# Patient Record
Sex: MALE | Race: WHITE | NOT HISPANIC OR LATINO | Employment: OTHER | URBAN - METROPOLITAN AREA
[De-identification: names, ages, dates, MRNs, and addresses within clinical notes are randomized per-mention and may not be internally consistent; named-entity substitution may affect disease eponyms.]

---

## 2017-07-14 ENCOUNTER — TELEPHONE (OUTPATIENT)
Dept: NEUROLOGY | Facility: CLINIC | Age: 72
End: 2017-07-14

## 2017-07-14 NOTE — TELEPHONE ENCOUNTER
Spoke to Mariah regarding rescheduling pts appointment.  Mariah states pt has become more combative and has moved out of [facility] against her will. I rescheduled appointment for 7-17-17 with . She will cb if needed.

## 2017-07-17 ENCOUNTER — OFFICE VISIT (OUTPATIENT)
Dept: NEUROLOGY | Facility: CLINIC | Age: 72
End: 2017-07-17

## 2017-07-17 VITALS
BODY MASS INDEX: 23.7 KG/M2 | HEIGHT: 69 IN | DIASTOLIC BLOOD PRESSURE: 74 MMHG | WEIGHT: 160 LBS | SYSTOLIC BLOOD PRESSURE: 128 MMHG

## 2017-07-17 DIAGNOSIS — F01.518 VASCULAR DEMENTIA WITH BEHAVIOR DISTURBANCE (HCC): Primary | ICD-10-CM

## 2017-07-17 PROCEDURE — 99214 OFFICE O/P EST MOD 30 MIN: CPT | Performed by: PSYCHIATRY & NEUROLOGY

## 2017-07-17 NOTE — PROGRESS NOTES
"Subjective      CC: Vascular Dementia    History of Present Illness   Stephen Zarate returns to clinic today with a history of suspected Vascular Dementia . He has noted symptoms since at least early 2016 after a stroke. He has noted some memory impairment. His family has noted more prominent changes in personality and judgment, along with increased irritability and declining hygiene. He and his family feel that this has improved, though not back to his baseline.     Evaluation in [place] reportedly showed evidence of multiple strokes (left occipital and right parietal). Carotid Doppler studies were reportedly normal. He was placed on anticoagulation for new onset atrial fibrillation. An EEG was interpreted as consistent with encephalopathy. Neuropsychological testing was consistent with a moderate dementia. Screening bloodwork in 7/16 was notable for mildly elevated AST and ALT, but otherwise unremarkable.     He was placed on Keppra in [place] for a suspicion of occipital seizures. He had described positive visual phenomena, which resolved quickly with the institution of Keppra. However, these symptoms varied, and were not clearly stereotyped.     Since his last visit on 11/23/16, his family has noted continued problems with anger and mood swings. He has moved himself out of [facility]. His family has considerable concerns about his ability to care for himself. He is apparently taking most of his medications as packaged by his pharmacy. He is not taking insulin currently, and his blood sugars have been high.      The following portions of the patient's history were reviewed and updated as appropriate: allergies, current medications, past family history, past medical history, past social history, past surgical history and problem list.    Review of Systems   Constitutional: Negative.        Objective     /74  Ht 69\" (175.3 cm)  Wt 160 lb (72.6 kg)  BMI 23.63 kg/m2    General appearance today is normal. "       Physical Exam   Constitutional: He is oriented to person, place, and time.   Neurological: He is oriented to person, place, and time. He has normal strength. He has a normal Finger-Nose-Finger Test.   Reflex Scores:       Tricep reflexes are 2+ on the right side and 2+ on the left side.       Bicep reflexes are 2+ on the right side and 2+ on the left side.       Brachioradialis reflexes are 2+ on the right side and 2+ on the left side.  Psychiatric: His speech is normal.        Neurologic Exam     Mental Status   Oriented to person, place, and time.   Registration: recalls 3 of 3 objects. Recall at 5 minutes: recalls 2 of 3 objects. Follows 3 step commands.   Attention: normal.   Speech: speech is normal   Level of consciousness: alert  Able to name object. Able to read. Able to repeat. Able to write. Normal comprehension.     Cranial Nerves   Cranial nerves II through XII intact.     Motor Exam   Muscle bulk: normal  Overall muscle tone: normal    Strength   Strength 5/5 throughout.     Sensory Exam   Light touch normal.     Gait, Coordination, and Reflexes     Coordination   Finger to nose coordination: normal    Reflexes   Right brachioradialis: 2+  Left brachioradialis: 2+  Right biceps: 2+  Left biceps: 2+  Right triceps: 2+  Left triceps: 2+        Results  MMSE=29      Assessment/Plan   Stephen was seen today for follow-up.    Diagnoses and all orders for this visit:    Vascular dementia with behavior disturbance        Discussion/Summary   Stephen Zarate returns to clinic today with a history of suspected vascular dementia (VaD). His history remains consistent with this diagnosis, though his examination remains surprisingly strong. Further, it is difficult to correlate his symptoms (behavioral and dysexecutive syndrome) with occipital and parietal strokes. At this time, I have recommended that we repeat an MRI and neuropsychological testing to assess his condition and competency more carefully. He reports  that he has not been taking Keppra since 11/16 and has been without visual symptoms suggestive of seizure. I discussed the risk of recurrent seizure, though it might be reasonable to remain off of this medication. He is agreeable to remaining on donepezil and memantine, which he believes have been helpful. He will then follow up in 3 months , or sooner if needed.       I spent 30 minutes of face-to-face time with the patient. Approximately 20 minutes were spent in counseling, including review of his current status, symptoms, management and treatment options.

## 2017-07-18 ENCOUNTER — TELEPHONE (OUTPATIENT)
Dept: NEUROLOGY | Facility: CLINIC | Age: 72
End: 2017-07-18
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
## 2017-08-01 ENCOUNTER — APPOINTMENT (OUTPATIENT)
Dept: MRI IMAGING | Facility: HOSPITAL | Age: 72
End: 2017-08-01
Attending: PSYCHIATRY & NEUROLOGY

## 2017-08-02 ENCOUNTER — HOSPITAL ENCOUNTER (OUTPATIENT)
Dept: MRI IMAGING | Facility: HOSPITAL | Age: 72
Discharge: HOME | End: 2017-08-02
Attending: PSYCHIATRY & NEUROLOGY | Admitting: PSYCHIATRY & NEUROLOGY

## 2017-08-02 ENCOUNTER — TELEPHONE (OUTPATIENT)
Dept: NEUROLOGY | Facility: CLINIC | Age: 72
End: 2017-08-02

## 2017-08-02 PROCEDURE — 70551 MRI BRAIN STEM W/O DYE: CPT

## 2017-08-02 NOTE — TELEPHONE ENCOUNTER
----- Message from YAHAIRA Rogers sent at 8/2/2017  9:48 AM EDT -----  Regarding: neuropsych appt  Contact: [phone]  Phone call from daughter, Nadine, they have not heard anything from [facility] about a neuropsych appt. Can you please see what you can find out and call Nadine? Thanks!

## 2017-08-02 NOTE — TELEPHONE ENCOUNTER
Returned Nadine's call and informed her to contact  regarding scheduling process ( I notified Nadine that its usually take a while to get an appt at  neuro psych)  She verbalized understanding, I notified her of  scheduling phone number, and alternative physicians Robert Roberts [phone], Alice Redding [phone]. She said she will think about alternative physicians and cb if needed.

## 2017-08-14 RX ORDER — MEMANTINE HYDROCHLORIDE 10 MG/1
TABLET ORAL
Qty: 60 TABLET | Refills: 11 | Status: SHIPPED | OUTPATIENT
Start: 2017-08-14 | End: 2018-08-08 | Stop reason: SDUPTHER

## 2017-08-14 RX ORDER — DONEPEZIL HYDROCHLORIDE 5 MG/1
TABLET, FILM COATED ORAL
Qty: 30 TABLET | Refills: 11 | Status: SHIPPED | OUTPATIENT
Start: 2017-08-14 | End: 2018-08-08 | Stop reason: SDUPTHER

## 2017-10-11 ENCOUNTER — TELEPHONE (OUTPATIENT)
Dept: NEUROLOGY | Facility: CLINIC | Age: 72
End: 2017-10-11

## 2017-10-11 NOTE — TELEPHONE ENCOUNTER
Pt is scheduled at [facility] 5-3-18 @ 8:00 am. I contacted Mariah and infromed her of apt date/ time she verbalized understanding and said she is not vidhya to keep that apt, gave her # to  so she can call to reschedule. thanks

## 2017-10-11 NOTE — TELEPHONE ENCOUNTER
Daughter, Mariah, called asking if they should keep next week's appt as they haven't been seen for neuropsych testing yet. They haven't been called and given an appointment time (Sherice, can you please follow up on the status of this referral and let me know?). Thank you

## 2017-10-11 NOTE — TELEPHONE ENCOUNTER
The follow-up appointment is to check his overall status, and is not just dependent on the neuropsych testing. However, if he is unchanged and would like to delay follow-up for another 3-6 months that is reasonable. Thanks.

## 2017-10-13 ENCOUNTER — TELEPHONE (OUTPATIENT)
Dept: NEUROLOGY | Facility: CLINIC | Age: 72
End: 2017-10-13
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
## 2017-10-20 ENCOUNTER — OFFICE VISIT (OUTPATIENT)
Dept: NEUROLOGY | Facility: CLINIC | Age: 72
End: 2017-10-20

## 2017-10-20 VITALS
BODY MASS INDEX: 22.81 KG/M2 | SYSTOLIC BLOOD PRESSURE: 123 MMHG | DIASTOLIC BLOOD PRESSURE: 80 MMHG | WEIGHT: 154 LBS | HEIGHT: 69 IN

## 2017-10-20 DIAGNOSIS — F01.518 VASCULAR DEMENTIA WITH BEHAVIOR DISTURBANCE (HCC): Primary | ICD-10-CM

## 2017-10-20 PROCEDURE — 99214 OFFICE O/P EST MOD 30 MIN: CPT | Performed by: PSYCHIATRY & NEUROLOGY

## 2017-10-23 ENCOUNTER — TELEPHONE (OUTPATIENT)
Dept: NEUROLOGY | Facility: CLINIC | Age: 72
End: 2017-10-23

## 2017-10-25 ENCOUNTER — TELEPHONE (OUTPATIENT)
Dept: NEUROLOGY | Facility: CLINIC | Age: 72
End: 2017-10-25

## 2017-10-25 NOTE — TELEPHONE ENCOUNTER
Phone call with Mariah to discuss care plan options after Mr. Zarate's last visit. She would like to pursue referral to [facility] Dr. Phuc Downs for neuropsych testing to see if they have a sooner appt than Dr. Vang at . She understands her options of calling APS or pursuing guardianship if she continues to feel he's not caring for himself properly. She does not feel she should pursue guardianship at this time and would like to see results of neuropsych testing. Our office sent referral to [facility] in [place]. She has phone numbers to both [facility] and  to keep in touch on getting an appt.

## 2018-08-09 RX ORDER — MEMANTINE HYDROCHLORIDE 10 MG/1
TABLET ORAL
Qty: 60 TABLET | Refills: 11 | Status: SHIPPED | OUTPATIENT
Start: 2018-08-09 | End: 2019-08-19 | Stop reason: SDUPTHER

## 2018-08-09 RX ORDER — DONEPEZIL HYDROCHLORIDE 5 MG/1
TABLET, FILM COATED ORAL
Qty: 30 TABLET | Refills: 11 | Status: SHIPPED | OUTPATIENT
Start: 2018-08-09 | End: 2019-08-19 | Stop reason: SDUPTHER

## 2018-09-19 ENCOUNTER — TELEPHONE (OUTPATIENT)
Dept: NEUROLOGY | Facility: CLINIC | Age: 73
End: 2018-09-19

## 2018-09-28 ENCOUNTER — OFFICE VISIT (OUTPATIENT)
Dept: NEUROLOGY | Facility: CLINIC | Age: 73
End: 2018-09-28

## 2018-09-28 VITALS
WEIGHT: 154 LBS | SYSTOLIC BLOOD PRESSURE: 123 MMHG | BODY MASS INDEX: 22.81 KG/M2 | HEIGHT: 69 IN | DIASTOLIC BLOOD PRESSURE: 84 MMHG

## 2018-09-28 DIAGNOSIS — F01.518 VASCULAR DEMENTIA WITH BEHAVIOR DISTURBANCE (HCC): Primary | ICD-10-CM

## 2018-09-28 DIAGNOSIS — I67.9 CEREBROVASCULAR DISEASE: ICD-10-CM

## 2018-09-28 PROCEDURE — 99214 OFFICE O/P EST MOD 30 MIN: CPT | Performed by: PSYCHIATRY & NEUROLOGY

## 2018-09-28 NOTE — PROGRESS NOTES
Subjective      CC: Vascular Dementia    History of Present Illness   Stephen Zarate returns to clinic today with a history of suspected Vascular Dementia . He has noted symptoms since at least early 2016 after a stroke. He has noted some memory impairment. His family has noted more prominent changes in personality and judgment, along with increased irritability and declining hygiene.     Prior evaluation in [place] reportedly showed evidence of multiple strokes (left occipital and right parietal). Carotid Doppler studies were reportedly normal. He was placed on anticoagulation for new onset atrial fibrillation. An EEG was interpreted as consistent with encephalopathy. Neuropsychological testing was consistent with a moderate dementia. Screening bloodwork in 7/16 was notable for mildly elevated AST and ALT, but otherwise unremarkable.     He was placed on Keppra in [place] for a suspicion of occipital seizures. He had described positive visual phenomena, which resolved quickly with the institution of Keppra. However, these symptoms varied, and were not clearly stereotyped. He stopped this medication in 11/16 and has had no recurrent symptoms suggestive of seizures.    Neuropsychological testing from 5/18 is consistent with VaD, including impairments in executive function.    Since his last visit on 10/20/17 he feels again improved. He does endorse some difficulty with memory, though feels he is doing well overall. His family continues to note widespread impairments in cognition. His daughter is helping with finances, though earlier he did let his insurance lapse. He receives some help from [facility].       The following portions of the patient's history were reviewed and updated as appropriate: allergies, current medications, past family history, past medical history, past social history, past surgical history and problem list.    Review of Systems   Constitutional: Negative.        Objective     /84   Ht  "175.3 cm (69.02\")   Wt 69.9 kg (154 lb)   BMI 22.73 kg/m²     General appearance today is normal.       Physical Exam   Psychiatric: His speech is normal.        Neurologic Exam     Mental Status   Oriented to person.   Disoriented to place.   Disoriented to time.   Registration: recalls 3 of 3 objects. Recall at 5 minutes: recalls 3 of 3 objects. Follows 3 step commands.   Attention: normal.   Speech: speech is normal   Level of consciousness: alert  Able to name object. Able to read. Able to repeat. Able to write. Normal comprehension.     Cranial Nerves   Cranial nerves II through XII intact.         Results  MMSE=26      Assessment/Plan   Stephen was seen today for memory loss.    Diagnoses and all orders for this visit:    Vascular dementia with behavior disturbance    Cerebrovascular disease        Discussion/Summary   Stephen Zarate returns to clinic today with a history of suspected vascular dementia (VaD). I reviewed his neuropsychological testing in some detail. I did discuss my belief that he needs proper safeguards in place, particularly for finances and medications. It is possible that we will need to re-examine his living conditions as a result, which I also mentioned. He has previously passed a formal driving evaluation, but we discussed that this could be repeated if concerns arise.  He will then follow up in 6 months, or sooner if needed.     I spent 25 minutes face to face with the patient and family. I spent 20 minutes counseling and discussing diagnosis, prognosis, diagnostic testing, evaluation, current status, treatment options and management.  "

## 2018-10-01 PROBLEM — I67.9 CEREBROVASCULAR DISEASE: Status: ACTIVE | Noted: 2018-10-01

## 2019-01-31 ENCOUNTER — TELEPHONE (OUTPATIENT)
Dept: NEUROLOGY | Facility: CLINIC | Age: 74
End: 2019-01-31

## 2019-01-31 NOTE — TELEPHONE ENCOUNTER
----- Message from Bethanie Goyal sent at 1/30/2019 10:58 AM EST -----  Contact: DAUGHTER  Mariah Cox - daughter called about her father.  Possibly moving him to [place] and needing suggestions about transportation options and daily help with him.    [phone]

## 2019-02-01 ENCOUNTER — TELEPHONE (OUTPATIENT)
Dept: NEUROLOGY | Facility: CLINIC | Age: 74
End: 2019-02-01

## 2019-02-01 NOTE — TELEPHONE ENCOUNTER
Spoke to Mariha Cox, daughter, who let me know that Mr. Zarate is moving from [place] to [place] to be closer to her. She is interested in services for him at home. He currently has caregivers coming in morning to remind him of meds, make meals and help get him ready for the day. She is needing names of new agencies for [place]. She is interested in transportation, and I will send her information on transportation companies as well. He will be near the [facility] which would be ideal to have him attend often. He is needing help with insulin which the caregiver agency can help remind him and set up the pen but cannot inject, which she understands. She is looking into a lock box for this as he's administered insulin incorrectly before. I am sending her information on pharmacies that can deliver dose packs if [facility] cannot deliver to his new address. She understands she can call me at any time to discuss other options if needed. I will email her the resources we discussed.

## 2019-04-15 ENCOUNTER — OFFICE VISIT (OUTPATIENT)
Dept: NEUROLOGY | Facility: CLINIC | Age: 74
End: 2019-04-15

## 2019-04-15 VITALS — WEIGHT: 154 LBS | HEIGHT: 69 IN | BODY MASS INDEX: 22.81 KG/M2

## 2019-04-15 DIAGNOSIS — I67.9 CEREBROVASCULAR DISEASE: ICD-10-CM

## 2019-04-15 DIAGNOSIS — F01.518 VASCULAR DEMENTIA WITH BEHAVIOR DISTURBANCE (HCC): Primary | ICD-10-CM

## 2019-04-15 PROCEDURE — 99214 OFFICE O/P EST MOD 30 MIN: CPT | Performed by: PHYSICIAN ASSISTANT

## 2019-04-15 RX ORDER — GLIMEPIRIDE 4 MG/1
TABLET ORAL
COMMUNITY
Start: 2017-11-02

## 2019-04-15 RX ORDER — SACCHAROMYCES BOULARDII 250 MG
CAPSULE ORAL
COMMUNITY
Start: 2017-11-02 | End: 2020-12-11

## 2019-04-15 NOTE — PROGRESS NOTES
Subjective     Chief Complaint: Vascular Dementia      History of Present Illness   Stephen Zarate is a 74 y.o. male who returns to clinic today with a history of suspected Vascular Dementia . He has noted symptoms since at least early 2016 after a stroke. He has noted some memory impairment. His family has noted more prominent changes in personality and judgment, along with increased irritability and declining hygiene.      Prior evaluation in [place] reportedly showed evidence of multiple strokes (left occipital and right parietal). Carotid Doppler studies were reportedly normal. He was placed on anticoagulation for new onset atrial fibrillation. An EEG was interpreted as consistent with encephalopathy. Neuropsychological testing was consistent with a moderate dementia. Screening bloodwork in 7/16 was notable for mildly elevated AST and ALT, but otherwise unremarkable.      He was placed on Keppra in [place] for a suspicion of occipital seizures. He had described positive visual phenomena, which resolved quickly with the institution of Keppra. However, these symptoms varied, and were not clearly stereotyped. He stopped this medication in 11/16 and has had no recurrent symptoms suggestive of seizures.     Neuropsychological testing from 5/18 is consistent with VaD, including impairments in executive function.    Today: Since his last visit in 9/18, he feels that his memory is improved. His family has noted a cognitive decline, particularly since moving to [place] in 1/19. His family has also noted occasional agitation and trouble sleeping. Additionally, he has noted some balance impairment.       I have reviewed and confirmed the past family, social and medical history as accurate on 4/15/19.     Review of Systems   Constitutional: Negative.    HENT: Negative.    Eyes: Negative.    Respiratory: Negative.    Cardiovascular: Negative.    Gastrointestinal: Negative.    Endocrine: Negative.    Genitourinary: Negative.  "   Musculoskeletal: Negative.    Skin: Negative.    Allergic/Immunologic: Negative.    Neurological:        Memory loss     Hematological: Negative.    Psychiatric/Behavioral: Positive for agitation. The patient is nervous/anxious.        Objective     Ht 175.3 cm (69.02\")   Wt 69.9 kg (154 lb)   BMI 22.73 kg/m²     General appearance today is normal.     Physical Exam   Constitutional: He is oriented to person, place, and time.   Neurological: He is oriented to person, place, and time. He has normal strength. He has a normal Finger-Nose-Finger Test.   Psychiatric: His speech is normal.        Neurologic Exam     Mental Status   Oriented to person, place, and time.   Disoriented to date.   Registration: recalls 3 of 3 objects. Recall at 5 minutes: recalls 3 of 3 objects. Follows 3 step commands.   Attention: normal.   Speech: speech is normal   Level of consciousness: alert  Able to name object. Able to read. Able to repeat. Able to write. Normal comprehension.     Cranial Nerves   Cranial nerves II through XII intact.     Motor Exam   Muscle bulk: normal  Overall muscle tone: normal    Strength   Strength 5/5 throughout.     Sensory Exam   Light touch normal.     Gait, Coordination, and Reflexes     Coordination   Finger to nose coordination: normal        Results  MMSE=28      Assessment/Plan   Stephen was seen today for memory loss.    Diagnoses and all orders for this visit:    Vascular dementia with behavior disturbance  -     Ambulatory Referral to Home Health    Cerebrovascular disease          Discussion/Summary   Stephen Zarate returns to clinic today with a history of suspected vascular dementia (VaD). This was discussed. I again reviewed his current status and treatment options. After discussing potential treatment options, it was elected to continue on donepezil and memantine unchanged. I also discussed potentially adding mirtazapine to help with his agitation and sleep, though this was ultimately " declined for now. I have made a referral to home health for PT given his balance impairment. He will then follow up in 6 months, or sooner if needed.   I spent 25 minutes face to face with the patient and family with 15 minutes spent on discussing diagnosis, evaluation, current status, treatment options and management as discussed above.       As part of this visit I obtained additional history from the family which is incorporated in the HPI.      Candice Stubbs PA-C

## 2019-08-08 ENCOUNTER — TELEPHONE (OUTPATIENT)
Dept: NEUROLOGY | Facility: CLINIC | Age: 74
End: 2019-08-08

## 2019-08-08 NOTE — TELEPHONE ENCOUNTER
Daughter left me a voicemail and emailed me about filling out a form for his [employer] application, transportation, stating his abilities/disability. She sent me the electronic web form to complete and I asked her for a printer friendly form to use that I can print for providers to fill out. Also informed her there is a form fee and we would call her when complete. I will await sending this to providers until we get a version I can print out for them to complete.

## 2019-08-19 RX ORDER — DONEPEZIL HYDROCHLORIDE 5 MG/1
TABLET, FILM COATED ORAL
Qty: 141 TABLET | Refills: 0 | Status: SHIPPED | OUTPATIENT
Start: 2019-08-19 | End: 2019-10-18

## 2019-08-19 RX ORDER — MEMANTINE HYDROCHLORIDE 10 MG/1
TABLET ORAL
Qty: 282 TABLET | Refills: 0 | Status: SHIPPED | OUTPATIENT
Start: 2019-08-19 | End: 2019-12-27

## 2019-08-31 ENCOUNTER — OFFICE VISIT (OUTPATIENT)
Dept: RETAIL CLINIC | Facility: CLINIC | Age: 74
End: 2019-08-31

## 2019-08-31 VITALS
WEIGHT: 156 LBS | BODY MASS INDEX: 23.11 KG/M2 | DIASTOLIC BLOOD PRESSURE: 64 MMHG | RESPIRATION RATE: 16 BRPM | HEIGHT: 69 IN | HEART RATE: 82 BPM | TEMPERATURE: 98.7 F | SYSTOLIC BLOOD PRESSURE: 100 MMHG

## 2019-08-31 DIAGNOSIS — H66.93 ACUTE OTITIS MEDIA, BILATERAL: Primary | ICD-10-CM

## 2019-08-31 DIAGNOSIS — J40 BRONCHITIS: ICD-10-CM

## 2019-08-31 PROCEDURE — 99203 OFFICE O/P NEW LOW 30 MIN: CPT | Performed by: NURSE PRACTITIONER

## 2019-08-31 RX ORDER — BENZONATATE 100 MG/1
100 CAPSULE ORAL 3 TIMES DAILY PRN
Qty: 21 CAPSULE | Refills: 0 | Status: SHIPPED | OUTPATIENT
Start: 2019-08-31 | End: 2019-09-07

## 2019-08-31 RX ORDER — AMOXICILLIN AND CLAVULANATE POTASSIUM 875; 125 MG/1; MG/1
1 TABLET, FILM COATED ORAL 2 TIMES DAILY
Qty: 20 TABLET | Refills: 0 | Status: SHIPPED | OUTPATIENT
Start: 2019-08-31 | End: 2019-09-10

## 2019-08-31 RX ORDER — GUAIFENESIN AND DEXTROMETHORPHAN HYDROBROMIDE 600; 30 MG/1; MG/1
1 TABLET, EXTENDED RELEASE ORAL 2 TIMES DAILY
Qty: 20 TABLET | Refills: 0 | Status: SHIPPED | OUTPATIENT
Start: 2019-08-31 | End: 2020-12-11

## 2019-08-31 NOTE — PROGRESS NOTES
"Subjective   Cough    Stephen Zarate is a 74 y.o. male with a history of diabetes, who is brought in by his daughter for evaluation of cough. Patient also with a h/o dementia and CVA. Daughter says cough intitially improved, now worsening with malaise and body aches. Mr. Zarate says that he sometimes gets strangled on thin liquids \"it goes down the wrong pipe\". Daughter reports blood glucose readings <110. Of note: patient takes Eliquis for treatment of atrial fibrillation.     Cough   This is a new problem. The current episode started 1 to 4 weeks ago. The problem has been gradually worsening. The problem occurs every few minutes. Cough characteristics: occasionally productive. Associated symptoms include ear congestion, a fever (subjective), headaches, myalgias, nasal congestion and shortness of breath (at times). Pertinent negatives include no chest pain, heartburn, hemoptysis, rash, sore throat, weight loss or wheezing. Postnasal drip: possibly. The symptoms are aggravated by lying down. He has tried nothing for the symptoms. His past medical history is significant for environmental allergies. There is no history of asthma, bronchitis or emphysema.        History Obtained from: Patient and Family    Past Medical History:   Diagnosis Date   • Atrial fibrillation (CMS/HCC)    • Dementia    • Diabetes mellitus (CMS/HCC)    • Hyperlipidemia    • Hypertension    • Seizure (CMS/HCC)    • Stroke (CMS/HCC)      History reviewed. No pertinent surgical history.  Social History     Tobacco Use   • Smoking status: Never Smoker   Substance Use Topics   • Alcohol use: No   • Drug use: No     Family History   Problem Relation Age of Onset   • Dementia Mother    • Alzheimer's disease Mother    • Hypertension Mother    • Stroke Mother    • Dementia Father    • Alzheimer's disease Father    • Hypertension Father      Allergies   Allergen Reactions   • Inositol Niacinate Hives   • Niacin Hives   • Sulfa Antibiotics      Current " Outpatient Medications   Medication Sig Dispense Refill   • apixaban (ELIQUIS) 5 MG tablet tablet Take 5 mg by mouth 2 (two) times a day.     • atorvastatin (LIPITOR) 40 MG tablet Take 40 mg by mouth daily.     • donepezil (ARICEPT) 5 MG tablet TAKE 1 TABLET BY MOUTH ONCE DAILY 141 tablet 0   • Empagliflozin (JARDIANCE PO) Take  by mouth.     • glimepiride (AMARYL) 4 MG tablet Take  by mouth.     • Insulin Degludec (TRESIBA SC) Inject  under the skin into the appropriate area as directed.     • losartan (COZAAR) 25 MG tablet Take 25 mg by mouth daily.     • memantine (NAMENDA) 10 MG tablet TAKE 1 TABLET BY MOUTH TWICE DAILY 282 tablet 0   • potassium chloride (K-DUR) 10 MEQ CR tablet Take 10 mEq by mouth 2 (two) times a day.     • ranitidine (ZANTAC) 150 MG tablet Take 150 mg by mouth 2 (two) times a day.     • saccharomyces boulardii (FLORASTOR) 250 MG capsule Take  by mouth.     • triamcinolone (KENALOG) 0.1 % cream Apply  topically 2 (two) times a day.     • amoxicillin-clavulanate (AUGMENTIN) 875-125 MG per tablet Take 1 tablet by mouth 2 (Two) Times a Day for 10 days. 20 tablet 0   • benzonatate (TESSALON PERLES) 100 MG capsule Take 1 capsule by mouth 3 (Three) Times a Day As Needed for Cough for up to 7 days. 21 capsule 0   • clindamycin (CLEOCIN) 300 MG capsule Take 300 mg by mouth 3 (three) times a day.     • glucose blood (EASYMAX 15 TEST) test strip 1 each by Other route as needed. Use as instructed     • guaifenesin-dextromethorphan (MUCINEX DM)  MG tablet sustained-release 12 hour tablet Take 1 tablet by mouth 2 (Two) Times a Day. 20 tablet 0   • levofloxacin (LEVAQUIN) 500 MG tablet Take 500 mg by mouth daily.     • potassium chloride (K-DUR,KLOR-CON) 10 MEQ CR tablet Take 10 mEq by mouth 2 (two) times a day.     • saccharomyces boulardii (FLORASTOR) 250 MG capsule Take 250 mg by mouth 2 (two) times a day.     • traMADol (ULTRAM) 50 MG tablet Take 50 mg by mouth every 6 (six) hours as needed for  "moderate pain (4-6).       No current facility-administered medications for this visit.         The following portions of the patient's history were reviewed and updated as appropriate: allergies, current medications, past family history, past medical history, past social history and past surgical history.    Review of Systems   Constitutional: Positive for appetite change, fatigue and fever (subjective). Negative for weight loss.   HENT: Positive for congestion and trouble swallowing (at times, has trouble with thin liquids). Negative for ear discharge, mouth sores, sinus pressure, sneezing and sore throat. Postnasal drip: possibly.    Eyes: Negative.    Respiratory: Positive for cough and shortness of breath (at times). Negative for hemoptysis, chest tightness, wheezing and stridor.    Cardiovascular: Negative for chest pain and palpitations.   Gastrointestinal: Negative for diarrhea, heartburn, nausea and vomiting.   Endocrine: Negative for polydipsia, polyphagia and polyuria.   Musculoskeletal: Positive for arthralgias and myalgias. Negative for neck pain and neck stiffness.   Skin: Negative for rash and wound.   Allergic/Immunologic: Positive for environmental allergies. Negative for immunocompromised state.   Neurological: Positive for numbness (neuropathy) and headaches. Negative for dizziness and light-headedness.   Psychiatric/Behavioral: Positive for confusion (at times, memory deficit) and sleep disturbance (due to coughing). Negative for agitation.       Objective     VITAL SIGNS:   Vitals:    08/31/19 1602   BP: 100/64   Pulse: 82   Resp: 16   Temp: 98.7 °F (37.1 °C)   Weight: 70.8 kg (156 lb)   Height: 175.3 cm (69.02\")   Body mass index is 23.02 kg/m².    Physical Exam   Constitutional: He is cooperative.  Non-toxic appearance. No distress.   HENT:   Head: Atraumatic.   Right Ear: External ear and ear canal normal. Tympanic membrane is erythematous (mild). Tympanic membrane is not perforated and not " bulging. A middle ear effusion is present.   Left Ear: External ear and ear canal normal. Tympanic membrane is erythematous. Tympanic membrane is not perforated and not bulging.   Nose: No mucosal edema or nasal deformity.   Mouth/Throat: Uvula is midline. No posterior oropharyngeal edema or posterior oropharyngeal erythema.   Eyes: Pupils are equal, round, and reactive to light. Right conjunctiva is not injected. Left conjunctiva is not injected. No scleral icterus.   Neck: Phonation normal. Neck supple. No tracheal deviation present.   Cardiovascular: Normal rate and regular rhythm.   No murmur heard.  Pulmonary/Chest: No stridor. No tachypnea. No respiratory distress. He has no decreased breath sounds. He has no wheezes. He has rhonchi (bilateral) in the right upper field and the left upper field. He has no rales.   Deep, course coughing   Musculoskeletal: He exhibits no edema.   Neurological: He is alert.   Skin: Skin is warm and dry.   Psychiatric: His behavior is normal. His mood appears not anxious. Cognition and memory are impaired. He is attentive.           Assessment/Plan     Stephen was seen today for cough.    Diagnoses and all orders for this visit:    Acute otitis media, bilateral  -     Ambulatory Referral to Internal Medicine    Bronchitis  -     Ambulatory Referral to Internal Medicine    Other orders  -     amoxicillin-clavulanate (AUGMENTIN) 875-125 MG per tablet; Take 1 tablet by mouth 2 (Two) Times a Day for 10 days.  -     benzonatate (TESSALON PERLES) 100 MG capsule; Take 1 capsule by mouth 3 (Three) Times a Day As Needed for Cough for up to 7 days.  -     guaifenesin-dextromethorphan (MUCINEX DM)  MG tablet sustained-release 12 hour tablet; Take 1 tablet by mouth 2 (Two) Times a Day.        PLAN: Discussed diabetic sick day protocol and need for increased po intake of decaffeinated fluids. DD discussed with patient and daughter- advised to go to ER if worsening.  Patient should follow up  with primary care provider in 2 weeks and also for evaluation of unspecified dysphagia. See sooner if symptoms persist or for the development of new symptoms that need attention.    The patient/family voiced understanding and agreement to the patient treatment plan and instructions       RENE Austin

## 2019-10-18 ENCOUNTER — OFFICE VISIT (OUTPATIENT)
Dept: NEUROLOGY | Facility: CLINIC | Age: 74
End: 2019-10-18

## 2019-10-18 VITALS — WEIGHT: 156 LBS | HEART RATE: 85 BPM | OXYGEN SATURATION: 94 % | BODY MASS INDEX: 23.11 KG/M2 | HEIGHT: 69 IN

## 2019-10-18 DIAGNOSIS — I67.9 CEREBROVASCULAR DISEASE: Primary | ICD-10-CM

## 2019-10-18 DIAGNOSIS — F01.518 VASCULAR DEMENTIA WITH BEHAVIOR DISTURBANCE (HCC): ICD-10-CM

## 2019-10-18 PROCEDURE — 99214 OFFICE O/P EST MOD 30 MIN: CPT | Performed by: PSYCHIATRY & NEUROLOGY

## 2019-10-18 RX ORDER — PIOGLITAZONEHYDROCHLORIDE 15 MG/1
1 TABLET ORAL
COMMUNITY
Start: 2017-03-17

## 2019-10-18 RX ORDER — EMPAGLIFLOZIN 25 MG/1
TABLET, FILM COATED ORAL
Refills: 5 | COMMUNITY
Start: 2019-09-24

## 2019-10-18 RX ORDER — INSULIN DEGLUDEC 200 U/ML
INJECTION, SOLUTION SUBCUTANEOUS
Refills: 0 | COMMUNITY
Start: 2019-10-11

## 2019-10-18 RX ORDER — LANCING DEVICE
EACH MISCELLANEOUS
Refills: 0 | COMMUNITY
Start: 2019-07-15

## 2019-10-18 RX ORDER — LEVETIRACETAM 500 MG/1
1 TABLET ORAL EVERY 12 HOURS
COMMUNITY
Start: 2017-03-14 | End: 2020-12-11

## 2019-10-18 RX ORDER — OMEPRAZOLE 40 MG/1
1 CAPSULE, DELAYED RELEASE ORAL
COMMUNITY
Start: 2017-02-08 | End: 2020-12-11

## 2019-10-18 RX ORDER — SACCHAROMYCES BOULARDII 250 MG
CAPSULE ORAL
COMMUNITY
Start: 2017-11-02

## 2019-10-18 RX ORDER — CETIRIZINE HYDROCHLORIDE 10 MG/1
1 TABLET ORAL
COMMUNITY
Start: 2017-03-14

## 2019-10-18 RX ORDER — DONEPEZIL HYDROCHLORIDE 10 MG/1
10 TABLET, FILM COATED ORAL DAILY
Qty: 30 TABLET | Refills: 11 | Status: SHIPPED | OUTPATIENT
Start: 2019-10-18 | End: 2020-09-15

## 2019-10-18 RX ORDER — LANCETS 28 GAUGE
EACH MISCELLANEOUS
Refills: 2 | COMMUNITY
Start: 2019-09-23

## 2019-10-18 RX ORDER — ACETAMINOPHEN 160 MG
TABLET,DISINTEGRATING ORAL
Refills: 4 | COMMUNITY
Start: 2019-07-30

## 2019-10-18 NOTE — PROGRESS NOTES
"Subjective     Chief Complaint: Vascular Dementia      History of Present Illness   Stephen Zarate is a 74 y.o. male who returns to clinic today with a history of suspected Vascular Dementia . He has noted symptoms since at least early 2016 after a stroke. He has noted some memory impairment. His family has noted more prominent changes in personality and judgment, along with increased irritability and declining hygiene.      Prior evaluation in [place] reportedly showed evidence of multiple strokes (left occipital and right parietal). Carotid Doppler studies were reportedly normal. He was placed on anticoagulation for new onset atrial fibrillation. An EEG was interpreted as consistent with encephalopathy. Neuropsychological testing was consistent with a moderate dementia. Screening bloodwork in 7/16 was notable for mildly elevated AST and ALT, but otherwise unremarkable.      He was placed on Keppra in [place] for a suspicion of occipital seizures. He had described positive visual phenomena, which resolved quickly with the institution of Keppra. However, these symptoms varied, and were not clearly stereotyped. He stopped this medication in 11/16 and has had no recurrent symptoms suggestive of seizures.     Neuropsychological testing from 5/18 is consistent with VaD, including impairments in executive function.    Since his last visit on 4/15/19 his general health has improved along with better medication compliance under the supervision of his daughter. He feels improved overall, though his daughter notes continued impairments across all spheres of cognition which is relatively unchanged. He is sleeping well and is not currently having any BPSD.     I have reviewed and confirmed the past family, social and medical history as accurate on 10/18/19.     Review of Systems   Constitutional: Negative.        Objective     Pulse 85   Ht 175.3 cm (69\")   Wt 70.8 kg (156 lb)   SpO2 94%   BMI 23.04 kg/m²     General " appearance today is normal.     Physical Exam   Constitutional: He is oriented to person, place, and time.   Neurological: He is oriented to person, place, and time. He has normal strength.   Psychiatric: His speech is normal.        Neurologic Exam     Mental Status   Oriented to person, place, and time.   Registration: recalls 3 of 3 objects. Recall at 5 minutes: recalls 2 of 3 objects. Follows 3 step commands.   Attention: normal.   Speech: speech is normal   Level of consciousness: alert  Able to name object. Able to read. Able to repeat. Able to write. Normal comprehension.     Cranial Nerves   Cranial nerves II through XII intact.     Motor Exam   Muscle bulk: normal  Overall muscle tone: normal    Strength   Strength 5/5 throughout.         Results  MMSE=27      Assessment/Plan   Stephen was seen today for follow-up and alzheimer's disease.    Diagnoses and all orders for this visit:    Cerebrovascular disease  -     Ambulatory Referral to Home Health    Vascular dementia with behavior disturbance (CMS/HCC)  -     Ambulatory Referral to Home Health    Other orders  -     donepezil (ARICEPT) 10 MG tablet; Take 1 tablet by mouth Daily.          Discussion/Summary   Stephen Zarate returns to clinic today with a history of suspected vascular dementia (VaD). This was discussed. I again reviewed his current status and treatment options. After discussing potential treatment options, it was elected to continue on memantine unchanged and increase donepezil to 10 mg daily. I have again made a referral to home health for PT given his balance impairment. He will then follow up in 6 months, or sooner if needed.     I spent 25 minutes face to face with the patient and family with 15 minutes spent on discussing evaluation, current status, treatment options and management as discussed above.       As part of this visit I obtained additional history from the family which is incorporated in the HPI.      Lavelle Awad MD

## 2019-12-27 RX ORDER — MEMANTINE HYDROCHLORIDE 10 MG/1
TABLET ORAL
Qty: 282 TABLET | Refills: 5 | Status: SHIPPED | OUTPATIENT
Start: 2019-12-27 | End: 2021-01-11

## 2020-05-12 ENCOUNTER — TELEMEDICINE (OUTPATIENT)
Dept: NEUROLOGY | Facility: CLINIC | Age: 75
End: 2020-05-12

## 2020-05-12 DIAGNOSIS — F01.518 VASCULAR DEMENTIA WITH BEHAVIOR DISTURBANCE (HCC): ICD-10-CM

## 2020-05-12 DIAGNOSIS — I67.9 CEREBROVASCULAR DISEASE: Primary | ICD-10-CM

## 2020-05-12 PROCEDURE — 99214 OFFICE O/P EST MOD 30 MIN: CPT | Performed by: PHYSICIAN ASSISTANT

## 2020-05-12 NOTE — PROGRESS NOTES
Subjective     Chief Complaint: vascular dementia      History of Present Illness   Stephen Zarate is a 75 y.o. male who returns to clinic today with a history of suspected Vascular Dementia . He has noted symptoms since at least early 2016 after a stroke. He has noted some memory impairment. His family has noted more prominent changes in personality and judgment, along with increased irritability and declining hygiene.      Prior evaluation in [place] reportedly showed evidence of multiple strokes (left occipital and right parietal). Carotid Doppler studies were reportedly normal. He was placed on anticoagulation for new onset atrial fibrillation. An EEG was interpreted as consistent with encephalopathy. Neuropsychological testing was consistent with a moderate dementia. Screening bloodwork in 7/16 was notable for mildly elevated AST and ALT, but otherwise unremarkable.      He was placed on Keppra in [place] for a suspicion of occipital seizures. He had described positive visual phenomena, which resolved quickly with the institution of Keppra. However, these symptoms varied, and were not clearly stereotyped. He stopped this medication in 11/16 and has had no recurrent symptoms suggestive of seizures.     Neuropsychological testing from 5/18 is consistent with VaD, including impairments in executive function.    Today: Since his last visit in 10/19, he feels that his memory may be somewhat worse. His family agrees.        I have reviewed and confirmed the past family, social and medical history as accurate on 5/12/2020.     Review of Systems   Constitutional: Negative.    HENT: Negative.    Eyes: Negative.    Respiratory: Negative.    Cardiovascular: Negative.    Gastrointestinal: Negative.    Endocrine: Negative.    Genitourinary: Negative.    Musculoskeletal: Negative.    Skin: Negative.    Allergic/Immunologic: Negative.    Neurological:        Memory loss    Hematological: Negative.        Objective     General  appearance today is normal.         Physical Exam   Psychiatric: His speech is normal.        Neurologic Exam     Mental Status   Oriented to person.   Oriented to place.   Disoriented to time. Disoriented to month, date and season. Oriented to year and day.   Registration: recalls 3 of 3 objects. Recall at 5 minutes: recalls 2 of 3 objects. Follows 3 step commands.   Attention: normal.   Speech: speech is normal   Level of consciousness: alert  Able to name object. Able to read. Able to repeat. Able to write. Normal comprehension.         Results  MMSE=25      Assessment/Plan   Diagnoses and all orders for this visit:    Cerebrovascular disease  -     Ambulatory Referral to Speech Therapy    Vascular dementia with behavior disturbance (CMS/HCC)  -     Ambulatory Referral to Speech Therapy          Discussion/Summary   Stephen Zarate returns to clinic today with a history of suspected vascular dementia (VaD). I again reviewed his current status and treatment options. After discussing potential treatment options, it was elected to continue on  donepezil and memantine unchanged. I have also made a referral to [facility]LP for cognitive rehabilitation. He will then follow up in 6 months , or sooner if needed.   I spent 25 minutes face to face with the patient and family with 15 minutes spent on discussing evaluation, current status, treatment options and management as discussed above.     This visit was performed via [employer] Video Chat with patient's consent.     As part of this visit I obtained additional history from the family which is incorporated in the HPI.      Candice Stubbs PA-C

## 2020-07-17 ENCOUNTER — TELEPHONE (OUTPATIENT)
Dept: NEUROLOGY | Facility: CLINIC | Age: 75
End: 2020-07-17

## 2020-07-17 NOTE — TELEPHONE ENCOUNTER
Kenzie with Dr Cabrera Oral surgery ([phone]) called regarding pt Eliquis 5 mg needing advise how to handle pt blood thinner. Please advise. Thanks.

## 2020-07-20 NOTE — TELEPHONE ENCOUNTER
Called and spoke to Neema with Dr. Cabrera office informing per Dr Esparza advice to referring to pt Cardiologist. Neema stated verbal understanding and appreciation. Thanks.

## 2020-09-15 RX ORDER — DONEPEZIL HYDROCHLORIDE 10 MG/1
TABLET, FILM COATED ORAL
Qty: 30 TABLET | Refills: 11 | Status: SHIPPED | OUTPATIENT
Start: 2020-09-15 | End: 2021-09-07

## 2020-12-11 ENCOUNTER — OFFICE VISIT (OUTPATIENT)
Dept: NEUROLOGY | Facility: CLINIC | Age: 75
End: 2020-12-11

## 2020-12-11 VITALS
OXYGEN SATURATION: 99 % | SYSTOLIC BLOOD PRESSURE: 124 MMHG | DIASTOLIC BLOOD PRESSURE: 84 MMHG | TEMPERATURE: 97.3 F | HEART RATE: 72 BPM

## 2020-12-11 DIAGNOSIS — I67.9 CEREBROVASCULAR DISEASE: Primary | ICD-10-CM

## 2020-12-11 DIAGNOSIS — F01.518 VASCULAR DEMENTIA WITH BEHAVIOR DISTURBANCE (HCC): ICD-10-CM

## 2020-12-11 PROCEDURE — 99213 OFFICE O/P EST LOW 20 MIN: CPT | Performed by: PSYCHIATRY & NEUROLOGY

## 2020-12-11 NOTE — PROGRESS NOTES
Subjective     Chief Complaint: vascular dementia      History of Present Illness   Stephen Zarate is a 75 y.o. male who returns to clinic today with a history of suspected Vascular Dementia . He has noted symptoms since at least early 2016 after a stroke. He has noted some memory impairment. His family has noted more prominent changes in personality and judgment, along with increased irritability and declining hygiene.      Prior evaluation in [place] reportedly showed evidence of multiple strokes (left occipital and right parietal). Carotid Doppler studies were reportedly normal. He was placed on anticoagulation for new onset atrial fibrillation. An EEG was interpreted as consistent with encephalopathy. Neuropsychological testing was consistent with a moderate dementia. Screening bloodwork in 7/16 was notable for mildly elevated AST and ALT, but otherwise unremarkable.      He was placed on Keppra in [place] for a suspicion of occipital seizures. He had described positive visual phenomena, which resolved quickly with the institution of Keppra. However, these symptoms varied, and were not clearly stereotyped. He stopped this medication in 11/16 and has had no recurrent symptoms suggestive of seizures.     Neuropsychological testing from 5/18 is consistent with VaD, including impairments in executive function.    Since his last visit on 5/12/20 he feels unchanged. He has gotten lost walking on one occasion. His family has noted only slight decline cognitively. No seizure like activity has been noted.      I have reviewed and confirmed the past family, social and medical history as accurate on 12/11/20.     Review of Systems   Constitutional: Negative.        Objective     General appearance today is normal.         Physical Exam   Neurological: He has normal strength. He has a normal Finger-Nose-Finger Test.   Psychiatric: His speech is normal.        Neurologic Exam     Mental Status   Oriented to person.    Disoriented to place.   Disoriented to time.   Registration: recalls 3 of 3 objects. Recall at 5 minutes: recalls 2 of 3 objects. Follows 3 step commands.   Attention: normal.   Speech: speech is normal   Level of consciousness: alert  Able to name object. Able to read. Able to repeat. Able to write. Normal comprehension.     Cranial Nerves   Cranial nerves II through XII intact.     Motor Exam   Muscle bulk: normal  Overall muscle tone: normal    Strength   Strength 5/5 throughout.     Gait, Coordination, and Reflexes     Coordination   Finger to nose coordination: normal        Results  MMSE=25 (unchanged)      Assessment/Plan   Diagnoses and all orders for this visit:    1. Cerebrovascular disease (Primary)    2. Vascular dementia with behavior disturbance (CMS/HCC)          Discussion/Summary   Stephen Zarate returns to clinic today with a history of suspected vascular dementia (VaD). After discussion, it was elected to continue on  donepezil and memantine unchanged. He will then follow up in 6 months, or sooner if needed.     I spent 15 minutes face to face with the patient and family. I spent 10 minutes counseling and discussing diagnosis, current status and management.    As part of this visit I obtained additional history from the family which is incorporated in the HPI.      Lavelle Awad MD

## 2021-01-11 RX ORDER — MEMANTINE HYDROCHLORIDE 10 MG/1
TABLET ORAL
Qty: 60 TABLET | Refills: 5 | Status: SHIPPED | OUTPATIENT
Start: 2021-01-11 | End: 2021-06-23

## 2021-06-23 RX ORDER — MEMANTINE HYDROCHLORIDE 10 MG/1
TABLET ORAL
Qty: 60 TABLET | Refills: 5 | Status: SHIPPED | OUTPATIENT
Start: 2021-06-23 | End: 2021-11-24

## 2021-09-07 RX ORDER — DONEPEZIL HYDROCHLORIDE 10 MG/1
TABLET, FILM COATED ORAL
Qty: 30 TABLET | Refills: 11 | Status: SHIPPED | OUTPATIENT
Start: 2021-09-07 | End: 2022-07-07

## 2021-11-24 RX ORDER — MEMANTINE HYDROCHLORIDE 10 MG/1
TABLET ORAL
Qty: 60 TABLET | Refills: 5 | Status: SHIPPED | OUTPATIENT
Start: 2021-11-24 | End: 2022-05-04 | Stop reason: SDUPTHER

## 2022-05-03 RX ORDER — MEMANTINE HYDROCHLORIDE 10 MG/1
TABLET ORAL
Qty: 60 TABLET | Refills: 5 | OUTPATIENT
Start: 2022-05-03

## 2022-05-04 RX ORDER — MEMANTINE HYDROCHLORIDE 10 MG/1
10 TABLET ORAL 2 TIMES DAILY
Qty: 60 TABLET | Refills: 2 | Status: SHIPPED | OUTPATIENT
Start: 2022-05-04

## 2022-05-04 NOTE — TELEPHONE ENCOUNTER
Called and spoke with Mariah as per bita Stubbs need to get Stephen on for a follow up. 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 Thanks!    Will go ahead and send in refills of his memantine- 3 months worth to get him enought until his fup in July.

## 2022-07-07 RX ORDER — DONEPEZIL HYDROCHLORIDE 10 MG/1
TABLET, FILM COATED ORAL
Qty: 30 TABLET | Refills: 11 | Status: SHIPPED | OUTPATIENT
Start: 2022-07-07

## 2022-07-22 ENCOUNTER — OFFICE VISIT (OUTPATIENT)
Dept: NEUROLOGY | Facility: CLINIC | Age: 77
End: 2022-07-22

## 2022-07-22 VITALS
SYSTOLIC BLOOD PRESSURE: 120 MMHG | HEART RATE: 72 BPM | RESPIRATION RATE: 16 BRPM | DIASTOLIC BLOOD PRESSURE: 64 MMHG | OXYGEN SATURATION: 100 %

## 2022-07-22 DIAGNOSIS — I67.9 CEREBROVASCULAR DISEASE: ICD-10-CM

## 2022-07-22 DIAGNOSIS — F01.518 VASCULAR DEMENTIA WITH BEHAVIOR DISTURBANCE: Primary | ICD-10-CM

## 2022-07-22 PROCEDURE — 99214 OFFICE O/P EST MOD 30 MIN: CPT | Performed by: PHYSICIAN ASSISTANT

## 2022-07-22 NOTE — PROGRESS NOTES
Subjective       Chief Complaint: memory loss      History of Present Illness   Stephen Zarate is a 77 y.o. male who returns to clinic today with a history of suspected Vascular Dementia . He has noted symptoms since at least early 2016 after a stroke. He has noted some memory impairment. His family has noted more prominent changes in personality and judgment, along with increased irritability and declining hygiene.      Prior evaluation in [place] reportedly showed evidence of multiple strokes (left occipital and right parietal). Carotid Doppler studies were reportedly normal. He was placed on anticoagulation for new onset atrial fibrillation. An EEG was interpreted as consistent with encephalopathy. Neuropsychological testing was consistent with a moderate dementia. Screening bloodwork in 7/16 was notable for mildly elevated AST and ALT, but otherwise unremarkable.      He was placed on Keppra in [place] for a suspicion of occipital seizures. He had described positive visual phenomena, which resolved quickly with the institution of Keppra. However, these symptoms varied, and were not clearly stereotyped. He stopped this medication in 11/16 and has had no recurrent symptoms suggestive of seizures.     Neuropsychological testing from 5/18 is consistent with VaD, including impairments in executive function.    Today: Since his last visit in 5/20, he feels essentially unchanged. His family feels that his memory has worsened. He is having some urinary and fecal incontinence. There is some increased frustration and agitation. He has also had increased balance impairment.       I have reviewed and confirmed the past family, social and medical history as accurate on 7/22/22.     Review of Systems   Constitutional: Negative.    HENT: Negative.    Eyes: Negative.    Respiratory: Negative.    Cardiovascular: Negative.    Gastrointestinal: Negative.    Endocrine: Negative.    Genitourinary: Negative.    Musculoskeletal:  Negative.    Skin: Negative.    Allergic/Immunologic: Negative.    Hematological: Negative.        Objective     /64   Pulse 72   Resp 16   SpO2 100%     General appearance today is normal.   Physical Exam  Neurological:      Coordination: Finger-Nose-Finger Test normal.      Deep Tendon Reflexes: Strength normal.   Psychiatric:         Speech: Speech normal.          Neurologic Exam     Mental Status   Oriented to person.   Disoriented to place.   Disoriented to time.   Registration: recalls 3 of 3 objects. Recall at 5 minutes: recalls 2 of 3 objects. Follows 3 step commands.   Attention: normal.   Speech: speech is normal   Level of consciousness: alert  Able to name object. Able to read. Able to repeat. Able to write. Normal comprehension.     Cranial Nerves   Cranial nerves II through XII intact.     Motor Exam   Muscle bulk: normal  Overall muscle tone: normal    Strength   Strength 5/5 throughout.     Gait, Coordination, and Reflexes     Gait  Gait: (quite unsteady, shuffling)    Coordination   Finger to nose coordination: normal    Tremor   Resting tremor: absent        Results  MMSE=24      Assessment & Plan   Diagnoses and all orders for this visit:    1. Vascular dementia with behavior disturbance (HCC) (Primary)    2. Cerebrovascular disease    Other orders  -     sertraline (Zoloft) 50 MG tablet; Take 1 tablet by mouth Daily.  Dispense: 30 tablet; Refill: 11          Discussion/Summary   Stephen Zarate returns to clinic today with a history of suspected vascular dementia (VaD). I again reviewed his current status and treatment options. After discussing potential treatment options, it was elected to add sertraline and continue donepezil and memantine unchanged. He will then follow up in 4-6 months , or sooner if needed.   Total time of visit today: 30 minutes. As part of this visit I obtained additional history from the family which is incorporated in the HPI. I also discussed diagnosis,  evaluation, current status, treatment options and management as discussed above.         Candice Stubbs PA-C

## 2022-07-26 ENCOUNTER — TELEPHONE (OUTPATIENT)
Dept: NEUROLOGY | Facility: CLINIC | Age: 77
End: 2022-07-26

## 2022-07-26 DIAGNOSIS — I67.9 CEREBROVASCULAR DISEASE: ICD-10-CM

## 2022-07-26 DIAGNOSIS — F01.518 VASCULAR DEMENTIA WITH BEHAVIOR DISTURBANCE: Primary | ICD-10-CM

## 2022-07-26 NOTE — TELEPHONE ENCOUNTER
Caller: Mariah Cox    Relationship: Emergency Contact    Best call back number: [phone]    What was the call regarding:   PT IS AT [facility]   [phone]    PT IS NEEDING SCRIPT FOR  sertraline (Zoloft) 50 MG tablet  AND ORDERS FOR PHYSICAL THERAPY AND OCCUPATIONAL THERAPY FAXED TO [facility] TO THE NUMBER LISTED ABOVE

## 2023-03-07 ENCOUNTER — APPOINTMENT (OUTPATIENT)
Dept: GENERAL RADIOLOGY | Facility: HOSPITAL | Age: 78
End: 2023-03-07
Payer: MEDICARE

## 2023-03-07 ENCOUNTER — HOSPITAL ENCOUNTER (OUTPATIENT)
Facility: HOSPITAL | Age: 78
LOS: 1 days | Discharge: HOME | End: 2023-03-08
Attending: EMERGENCY MEDICINE | Admitting: INTERNAL MEDICINE
Payer: MEDICARE

## 2023-03-07 ENCOUNTER — HOSPITAL ENCOUNTER (EMERGENCY)
Facility: HOSPITAL | Age: 78
Discharge: HOME | End: 2023-03-07
Attending: STUDENT IN AN ORGANIZED HEALTH CARE EDUCATION/TRAINING PROGRAM | Admitting: STUDENT IN AN ORGANIZED HEALTH CARE EDUCATION/TRAINING PROGRAM
Payer: MEDICARE

## 2023-03-07 ENCOUNTER — APPOINTMENT (OUTPATIENT)
Dept: CT IMAGING | Facility: HOSPITAL | Age: 78
End: 2023-03-07
Payer: MEDICARE

## 2023-03-07 VITALS
DIASTOLIC BLOOD PRESSURE: 78 MMHG | HEART RATE: 110 BPM | RESPIRATION RATE: 16 BRPM | TEMPERATURE: 99.8 F | HEIGHT: 69 IN | BODY MASS INDEX: 22.81 KG/M2 | SYSTOLIC BLOOD PRESSURE: 118 MMHG | OXYGEN SATURATION: 96 % | WEIGHT: 154 LBS

## 2023-03-07 DIAGNOSIS — Z86.59 HISTORY OF DEMENTIA: ICD-10-CM

## 2023-03-07 DIAGNOSIS — R41.82 ALTERED MENTAL STATUS, UNSPECIFIED ALTERED MENTAL STATUS TYPE: Primary | ICD-10-CM

## 2023-03-07 DIAGNOSIS — Z78.9 FAILURE OF OUTPATIENT TREATMENT: ICD-10-CM

## 2023-03-07 DIAGNOSIS — Z86.39 HISTORY OF HYPERLIPIDEMIA: ICD-10-CM

## 2023-03-07 DIAGNOSIS — Z87.898 HISTORY OF SEIZURE: ICD-10-CM

## 2023-03-07 DIAGNOSIS — Z91.81 AT HIGH RISK FOR FALLS: ICD-10-CM
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
## 2023-03-07 DIAGNOSIS — Z86.73 HISTORY OF STROKE: ICD-10-CM

## 2023-03-07 DIAGNOSIS — Z86.39 HISTORY OF DIABETES MELLITUS: ICD-10-CM

## 2023-03-07 DIAGNOSIS — Z79.01 ANTICOAGULATED BY ANTICOAGULATION TREATMENT: ICD-10-CM

## 2023-03-07 DIAGNOSIS — Z86.79 HISTORY OF HYPERTENSION: ICD-10-CM
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
## 2023-03-07 DIAGNOSIS — J18.9 PNEUMONIA OF LEFT LOWER LOBE DUE TO INFECTIOUS ORGANISM: ICD-10-CM

## 2023-03-07 DIAGNOSIS — Z86.79 HISTORY OF ATRIAL FIBRILLATION: ICD-10-CM

## 2023-03-07 PROBLEM — D72.829 LEUKOCYTOSIS: Status: ACTIVE | Noted: 2023-03-07

## 2023-03-07 PROBLEM — E87.1 HYPONATREMIA: Status: ACTIVE | Noted: 2023-03-07

## 2023-03-07 LAB
ALBUMIN SERPL-MCNC: 3.5 G/DL (ref 3.5–5.2)
ALBUMIN/GLOB SERPL: 1.2 G/DL
ALP SERPL-CCNC: 71 U/L (ref 39–117)
ALT SERPL W P-5'-P-CCNC: 27 U/L (ref 1–41)
ANION GAP SERPL CALCULATED.3IONS-SCNC: 11 MMOL/L (ref 5–15)
AST SERPL-CCNC: 28 U/L (ref 1–40)
BACTERIA UR QL AUTO: NORMAL /HPF
BASOPHILS # BLD AUTO: 0.04 10*3/MM3 (ref 0–0.2)
BASOPHILS NFR BLD AUTO: 0.4 % (ref 0–1.5)
BILIRUB SERPL-MCNC: 1.9 MG/DL (ref 0–1.2)
BILIRUB UR QL STRIP: ABNORMAL
BUN SERPL-MCNC: 18 MG/DL (ref 8–23)
BUN/CREAT SERPL: 21.4 (ref 7–25)
CALCIUM SPEC-SCNC: 8.5 MG/DL (ref 8.6–10.5)
CHLORIDE SERPL-SCNC: 101 MMOL/L (ref 98–107)
CLARITY UR: CLEAR
CO2 SERPL-SCNC: 23 MMOL/L (ref 22–29)
COLOR UR: ABNORMAL
CREAT SERPL-MCNC: 0.84 MG/DL (ref 0.76–1.27)
D-LACTATE SERPL-SCNC: 1.2 MMOL/L (ref 0.5–2)
D-LACTATE SERPL-SCNC: 1.3 MMOL/L (ref 0.5–2)
DEPRECATED RDW RBC AUTO: 41.7 FL (ref 37–54)
EGFRCR SERPLBLD CKD-EPI 2021: 89.8 ML/MIN/1.73
EOSINOPHIL # BLD AUTO: 0.07 10*3/MM3 (ref 0–0.4)
EOSINOPHIL NFR BLD AUTO: 0.6 % (ref 0.3–6.2)
ERYTHROCYTE [DISTWIDTH] IN BLOOD BY AUTOMATED COUNT: 12.5 % (ref 12.3–15.4)
GLOBULIN UR ELPH-MCNC: 3 GM/DL
GLUCOSE BLDC GLUCOMTR-MCNC: 216 MG/DL (ref 70–130)
GLUCOSE SERPL-MCNC: 321 MG/DL (ref 65–99)
GLUCOSE UR STRIP-MCNC: ABNORMAL MG/DL
HCT VFR BLD AUTO: 31.9 % (ref 37.5–51)
HGB BLD-MCNC: 11.1 G/DL (ref 13–17.7)
HGB UR QL STRIP.AUTO: NEGATIVE
HOLD SPECIMEN: NORMAL
HYALINE CASTS UR QL AUTO: NORMAL /LPF
IMM GRANULOCYTES # BLD AUTO: 0.11 10*3/MM3 (ref 0–0.05)
IMM GRANULOCYTES NFR BLD AUTO: 1 % (ref 0–0.5)
KETONES UR QL STRIP: ABNORMAL
LEUKOCYTE ESTERASE UR QL STRIP.AUTO: NEGATIVE
LYMPHOCYTES # BLD AUTO: 0.8 10*3/MM3 (ref 0.7–3.1)
LYMPHOCYTES NFR BLD AUTO: 7.1 % (ref 19.6–45.3)
MAGNESIUM SERPL-MCNC: 1.7 MG/DL (ref 1.6–2.4)
MCH RBC QN AUTO: 31.9 PG (ref 26.6–33)
MCHC RBC AUTO-ENTMCNC: 34.8 G/DL (ref 31.5–35.7)
MCV RBC AUTO: 91.7 FL (ref 79–97)
MONOCYTES # BLD AUTO: 1.05 10*3/MM3 (ref 0.1–0.9)
MONOCYTES NFR BLD AUTO: 9.3 % (ref 5–12)
NEUTROPHILS NFR BLD AUTO: 81.6 % (ref 42.7–76)
NEUTROPHILS NFR BLD AUTO: 9.24 10*3/MM3 (ref 1.7–7)
NITRITE UR QL STRIP: NEGATIVE
NRBC BLD AUTO-RTO: 0 /100 WBC (ref 0–0.2)
OSMOLALITY SERPL: 297 MOSM/KG (ref 275–295)
PH UR STRIP.AUTO: 5.5 [PH] (ref 5–8)
PLATELET # BLD AUTO: 300 10*3/MM3 (ref 140–450)
PMV BLD AUTO: 9.9 FL (ref 6–12)
POTASSIUM SERPL-SCNC: 3.9 MMOL/L (ref 3.5–5.2)
PROCALCITONIN SERPL-MCNC: 0.08 NG/ML (ref 0–0.25)
PROCALCITONIN SERPL-MCNC: 0.09 NG/ML (ref 0–0.25)
PROT SERPL-MCNC: 6.5 G/DL (ref 6–8.5)
PROT UR QL STRIP: ABNORMAL
RBC # BLD AUTO: 3.48 10*6/MM3 (ref 4.14–5.8)
RBC # UR STRIP: NORMAL /HPF
REF LAB TEST METHOD: NORMAL
SODIUM SERPL-SCNC: 135 MMOL/L (ref 136–145)
SP GR UR STRIP: 1.03 (ref 1–1.03)
SQUAMOUS #/AREA URNS HPF: NORMAL /HPF
TROPONIN T SERPL HS-MCNC: 19 NG/L
TSH SERPL DL<=0.05 MIU/L-ACNC: 1.99 UIU/ML (ref 0.27–4.2)
UROBILINOGEN UR QL STRIP: ABNORMAL
WBC # UR STRIP: NORMAL /HPF
WBC NRBC COR # BLD: 11.31 10*3/MM3 (ref 3.4–10.8)
WHOLE BLOOD HOLD COAG: NORMAL
WHOLE BLOOD HOLD SPECIMEN: NORMAL

## 2023-03-07 PROCEDURE — P9612 CATHETERIZE FOR URINE SPEC: HCPCS

## 2023-03-07 PROCEDURE — 83935 ASSAY OF URINE OSMOLALITY: CPT | Performed by: NURSE PRACTITIONER

## 2023-03-07 PROCEDURE — 99222 1ST HOSP IP/OBS MODERATE 55: CPT | Performed by: STUDENT IN AN ORGANIZED HEALTH CARE EDUCATION/TRAINING PROGRAM

## 2023-03-07 PROCEDURE — 83735 ASSAY OF MAGNESIUM: CPT | Performed by: EMERGENCY MEDICINE

## 2023-03-07 PROCEDURE — 93005 ELECTROCARDIOGRAM TRACING: CPT

## 2023-03-07 PROCEDURE — 83605 ASSAY OF LACTIC ACID: CPT | Performed by: EMERGENCY MEDICINE

## 2023-03-07 PROCEDURE — 96361 HYDRATE IV INFUSION ADD-ON: CPT

## 2023-03-07 PROCEDURE — 80053 COMPREHEN METABOLIC PANEL: CPT | Performed by: EMERGENCY MEDICINE

## 2023-03-07 PROCEDURE — 85025 COMPLETE CBC W/AUTO DIFF WBC: CPT | Performed by: EMERGENCY MEDICINE

## 2023-03-07 PROCEDURE — 93005 ELECTROCARDIOGRAM TRACING: CPT | Performed by: EMERGENCY MEDICINE

## 2023-03-07 PROCEDURE — 72192 CT PELVIS W/O DYE: CPT

## 2023-03-07 PROCEDURE — 83605 ASSAY OF LACTIC ACID: CPT | Performed by: NURSE PRACTITIONER

## 2023-03-07 PROCEDURE — 82962 GLUCOSE BLOOD TEST: CPT

## 2023-03-07 PROCEDURE — 99284 EMERGENCY DEPT VISIT MOD MDM: CPT

## 2023-03-07 PROCEDURE — 84145 PROCALCITONIN (PCT): CPT | Performed by: NURSE PRACTITIONER

## 2023-03-07 PROCEDURE — 96365 THER/PROPH/DIAG IV INF INIT: CPT

## 2023-03-07 PROCEDURE — 25010000002 PIPERACILLIN SOD-TAZOBACTAM PER 1 G: Performed by: EMERGENCY MEDICINE

## 2023-03-07 PROCEDURE — 99285 EMERGENCY DEPT VISIT HI MDM: CPT

## 2023-03-07 PROCEDURE — 25010000002 PIPERACILLIN SOD-TAZOBACTAM PER 1 G: Performed by: NURSE PRACTITIONER

## 2023-03-07 PROCEDURE — 87040 BLOOD CULTURE FOR BACTERIA: CPT | Performed by: EMERGENCY MEDICINE

## 2023-03-07 PROCEDURE — 70450 CT HEAD/BRAIN W/O DYE: CPT

## 2023-03-07 PROCEDURE — 81001 URINALYSIS AUTO W/SCOPE: CPT | Performed by: EMERGENCY MEDICINE

## 2023-03-07 PROCEDURE — 71045 X-RAY EXAM CHEST 1 VIEW: CPT

## 2023-03-07 PROCEDURE — 83930 ASSAY OF BLOOD OSMOLALITY: CPT | Performed by: NURSE PRACTITIONER

## 2023-03-07 PROCEDURE — 84145 PROCALCITONIN (PCT): CPT | Performed by: EMERGENCY MEDICINE

## 2023-03-07 PROCEDURE — 84484 ASSAY OF TROPONIN QUANT: CPT | Performed by: EMERGENCY MEDICINE

## 2023-03-07 PROCEDURE — 25010000002 VANCOMYCIN 10 G RECONSTITUTED SOLUTION: Performed by: EMERGENCY MEDICINE

## 2023-03-07 PROCEDURE — 84443 ASSAY THYROID STIM HORMONE: CPT | Performed by: NURSE PRACTITIONER

## 2023-03-07 RX ORDER — AMMONIUM LACTATE 12 G/100G
LOTION TOPICAL AS NEEDED
COMMUNITY

## 2023-03-07 RX ORDER — DONEPEZIL HYDROCHLORIDE 10 MG/1
10 TABLET, FILM COATED ORAL DAILY
Status: DISCONTINUED | OUTPATIENT
Start: 2023-03-08 | End: 2023-03-08 | Stop reason: HOSPADM

## 2023-03-07 RX ORDER — ACETAMINOPHEN 325 MG/1
650 TABLET ORAL EVERY 4 HOURS PRN
Status: DISCONTINUED | OUTPATIENT
Start: 2023-03-07 | End: 2023-03-08 | Stop reason: HOSPADM

## 2023-03-07 RX ORDER — ATORVASTATIN CALCIUM 40 MG/1
40 TABLET, FILM COATED ORAL NIGHTLY
Status: DISCONTINUED | OUTPATIENT
Start: 2023-03-07 | End: 2023-03-08 | Stop reason: HOSPADM

## 2023-03-07 RX ORDER — LANOLIN ALCOHOL/MO/W.PET/CERES
1000 CREAM (GRAM) TOPICAL DAILY
Status: DISCONTINUED | OUTPATIENT
Start: 2023-03-08 | End: 2023-03-08 | Stop reason: HOSPADM

## 2023-03-07 RX ORDER — ACETAMINOPHEN 650 MG/1
650 SUPPOSITORY RECTAL EVERY 4 HOURS PRN
Status: DISCONTINUED | OUTPATIENT
Start: 2023-03-07 | End: 2023-03-08 | Stop reason: HOSPADM

## 2023-03-07 RX ORDER — MELATONIN
2000 DAILY
Status: DISCONTINUED | OUTPATIENT
Start: 2023-03-08 | End: 2023-03-08 | Stop reason: HOSPADM

## 2023-03-07 RX ORDER — TRIAMCINOLONE ACETONIDE 1 MG/G
1 CREAM TOPICAL 2 TIMES DAILY
Status: DISCONTINUED | OUTPATIENT
Start: 2023-03-07 | End: 2023-03-08 | Stop reason: HOSPADM

## 2023-03-07 RX ORDER — CETIRIZINE HYDROCHLORIDE 10 MG/1
10 TABLET ORAL DAILY
Status: DISCONTINUED | OUTPATIENT
Start: 2023-03-08 | End: 2023-03-08 | Stop reason: HOSPADM

## 2023-03-07 RX ORDER — SACCHAROMYCES BOULARDII 250 MG
250 CAPSULE ORAL 2 TIMES DAILY
COMMUNITY

## 2023-03-07 RX ORDER — SODIUM CHLORIDE, SODIUM LACTATE, POTASSIUM CHLORIDE, CALCIUM CHLORIDE 600; 310; 30; 20 MG/100ML; MG/100ML; MG/100ML; MG/100ML
100 INJECTION, SOLUTION INTRAVENOUS CONTINUOUS
Status: ACTIVE | OUTPATIENT
Start: 2023-03-07 | End: 2023-03-08

## 2023-03-07 RX ORDER — VANCOMYCIN HYDROCHLORIDE 1 G/200ML
15 INJECTION, SOLUTION INTRAVENOUS
Status: DISCONTINUED | OUTPATIENT
Start: 2023-03-08 | End: 2023-03-08

## 2023-03-07 RX ORDER — NICOTINE POLACRILEX 4 MG
15 LOZENGE BUCCAL
Status: DISCONTINUED | OUTPATIENT
Start: 2023-03-07 | End: 2023-03-08 | Stop reason: HOSPADM

## 2023-03-07 RX ORDER — MEMANTINE HYDROCHLORIDE 10 MG/1
10 TABLET ORAL 2 TIMES DAILY
Status: DISCONTINUED | OUTPATIENT
Start: 2023-03-07 | End: 2023-03-08 | Stop reason: HOSPADM

## 2023-03-07 RX ORDER — SODIUM CHLORIDE 0.9 % (FLUSH) 0.9 %
10 SYRINGE (ML) INJECTION AS NEEDED
Status: DISCONTINUED | OUTPATIENT
Start: 2023-03-07 | End: 2023-03-08 | Stop reason: HOSPADM

## 2023-03-07 RX ORDER — ACETAMINOPHEN 160 MG/5ML
650 SOLUTION ORAL EVERY 4 HOURS PRN
Status: DISCONTINUED | OUTPATIENT
Start: 2023-03-07 | End: 2023-03-08 | Stop reason: HOSPADM

## 2023-03-07 RX ORDER — LANOLIN ALCOHOL/MO/W.PET/CERES
1000 CREAM (GRAM) TOPICAL DAILY
COMMUNITY

## 2023-03-07 RX ORDER — SACCHAROMYCES BOULARDII 250 MG
250 CAPSULE ORAL 2 TIMES DAILY
Status: DISCONTINUED | OUTPATIENT
Start: 2023-03-07 | End: 2023-03-08 | Stop reason: HOSPADM

## 2023-03-07 RX ORDER — FAMOTIDINE 20 MG/1
40 TABLET, FILM COATED ORAL DAILY
Status: DISCONTINUED | OUTPATIENT
Start: 2023-03-08 | End: 2023-03-08 | Stop reason: HOSPADM

## 2023-03-07 RX ORDER — AMMONIUM LACTATE 12 G/100G
LOTION TOPICAL AS NEEDED
Status: DISCONTINUED | OUTPATIENT
Start: 2023-03-07 | End: 2023-03-08 | Stop reason: HOSPADM

## 2023-03-07 RX ORDER — TRIAMCINOLONE ACETONIDE 1 MG/G
1 CREAM TOPICAL 2 TIMES DAILY
COMMUNITY

## 2023-03-07 RX ORDER — FAMOTIDINE 40 MG/1
40 TABLET, FILM COATED ORAL DAILY
COMMUNITY

## 2023-03-07 RX ORDER — DEXTROSE MONOHYDRATE 25 G/50ML
25 INJECTION, SOLUTION INTRAVENOUS
Status: DISCONTINUED | OUTPATIENT
Start: 2023-03-07 | End: 2023-03-08 | Stop reason: HOSPADM

## 2023-03-07 RX ORDER — INSULIN LISPRO 100 [IU]/ML
0-7 INJECTION, SOLUTION INTRAVENOUS; SUBCUTANEOUS
Status: DISCONTINUED | OUTPATIENT
Start: 2023-03-08 | End: 2023-03-08 | Stop reason: HOSPADM

## 2023-03-07 RX ORDER — LOSARTAN POTASSIUM 25 MG/1
25 TABLET ORAL DAILY
Status: DISCONTINUED | OUTPATIENT
Start: 2023-03-08 | End: 2023-03-08

## 2023-03-07 RX ADMIN — Medication 250 MG: at 21:36

## 2023-03-07 RX ADMIN — MEMANTINE 10 MG: 10 TABLET ORAL at 21:36

## 2023-03-07 RX ADMIN — ATORVASTATIN CALCIUM 40 MG: 40 TABLET, FILM COATED ORAL at 21:36

## 2023-03-07 RX ADMIN — TAZOBACTAM SODIUM AND PIPERACILLIN SODIUM 3.38 G: 375; 3 INJECTION, SOLUTION INTRAVENOUS at 18:53

## 2023-03-07 RX ADMIN — SODIUM CHLORIDE, POTASSIUM CHLORIDE, SODIUM LACTATE AND CALCIUM CHLORIDE 100 ML/HR: 600; 310; 30; 20 INJECTION, SOLUTION INTRAVENOUS at 21:39

## 2023-03-07 RX ADMIN — VANCOMYCIN HYDROCHLORIDE 1500 MG: 10 INJECTION, POWDER, LYOPHILIZED, FOR SOLUTION INTRAVENOUS at 19:27

## 2023-03-07 RX ADMIN — SODIUM CHLORIDE 1000 ML: 9 INJECTION, SOLUTION INTRAVENOUS at 17:54

## 2023-03-07 RX ADMIN — TAZOBACTAM SODIUM AND PIPERACILLIN SODIUM 3.38 G: 375; 3 INJECTION, SOLUTION INTRAVENOUS at 23:58

## 2023-03-07 RX ADMIN — APIXABAN 5 MG: 5 TABLET, FILM COATED ORAL at 21:36

## 2023-03-07 NOTE — DISCHARGE INSTRUCTIONS
Symptomatic care is recommended with Tylenol as needed for pain. Take all medications as prescribed and instructed. Follow up with primary care as directed or return to Emergency Department with worsening of symptoms.

## 2023-03-07 NOTE — ED PROVIDER NOTES
Subjective   History of Present Illness  Patient is a pleasant 77-year-old male who presents with altered mental status and falls.  EMS reported that he is at the skilled nursing facility and last night had a fall where he hit his head.  Patient is on anticoagulants and a CT of his head performed last night.  CT of the head was normal and patient was referred back to the nursing home apparently his mental status has been changing over the past 24 hours and this resulted in the nursing home sending him back to the emergency department.  The patient is unable to supply any history likely secondary to combination of his dementia along with mental status changes.  Although unreliable, the patient has no complaints at this time.        Review of Systems   Unable to perform ROS: Dementia   All other systems reviewed and are negative.      Past Medical History:   Diagnosis Date   • Atrial fibrillation (HCC)    • Dementia (HCC)    • Diabetes mellitus (HCC)    • Hyperlipidemia    • Hypertension    • Seizure (HCC)    • Stroke (HCC)        Allergies   Allergen Reactions   • Inositol Niacinate Hives   • Niacin Hives   • Shellfish-Derived Products Unknown - Low Severity   • Sulfa Antibiotics        History reviewed. No pertinent surgical history.    Family History   Problem Relation Age of Onset   • Dementia Mother    • Alzheimer's disease Mother    • Hypertension Mother    • Stroke Mother    • Dementia Father    • Alzheimer's disease Father    • Hypertension Father        Social History     Socioeconomic History   • Marital status:    Tobacco Use   • Smoking status: Never   • Smokeless tobacco: Never   Substance and Sexual Activity   • Alcohol use: No   • Drug use: No   • Sexual activity: Defer           Objective   Physical Exam  Vitals and nursing note reviewed.   Constitutional:       Appearance: He is ill-appearing (Chronically ill-appearing).   HENT:      Head: Normocephalic and atraumatic.      Mouth/Throat:       Mouth: Mucous membranes are moist.   Eyes:      Extraocular Movements: Extraocular movements intact.      Pupils: Pupils are equal, round, and reactive to light.   Cardiovascular:      Rate and Rhythm: Regular rhythm. Tachycardia present.      Heart sounds: Normal heart sounds. No murmur heard.  Pulmonary:      Effort: Pulmonary effort is normal. No respiratory distress.      Breath sounds: Normal breath sounds. No wheezing or rhonchi.   Abdominal:      General: Bowel sounds are normal. There is no distension.      Palpations: Abdomen is soft.      Tenderness: There is no abdominal tenderness. There is no guarding.      Comments: PEG tube in place   Musculoskeletal:         General: No swelling or tenderness. Normal range of motion.      Cervical back: Normal range of motion.   Skin:     General: Skin is warm.      Capillary Refill: Capillary refill takes less than 2 seconds.   Neurological:      Mental Status: He is lethargic and confused.      GCS: GCS eye subscore is 3. GCS verbal subscore is 3. GCS motor subscore is 6.      Comments: Oriented to self only.  Believes he is in [place] and is unable to state the year   Psychiatric:         Speech: Speech normal.         Behavior: Behavior normal.         Procedures           ED Course      Recent Results (from the past 24 hour(s))   ECG 12 Lead ED Triage Standing Order; Weak / Dizzy / AMS    Collection Time: 03/07/23  4:50 PM   Result Value Ref Range    QT Interval 326 ms    QTC Interval 462 ms   Comprehensive Metabolic Panel    Collection Time: 03/07/23  4:52 PM    Specimen: Blood   Result Value Ref Range    Glucose 321 (H) 65 - 99 mg/dL    BUN 18 8 - 23 mg/dL    Creatinine 0.84 0.76 - 1.27 mg/dL    Sodium 135 (L) 136 - 145 mmol/L    Potassium 3.9 3.5 - 5.2 mmol/L    Chloride 101 98 - 107 mmol/L    CO2 23.0 22.0 - 29.0 mmol/L    Calcium 8.5 (L) 8.6 - 10.5 mg/dL    Total Protein 6.5 6.0 - 8.5 g/dL    Albumin 3.5 3.5 - 5.2 g/dL    ALT (SGPT) 27 1 - 41 U/L    AST  (SGOT) 28 1 - 40 U/L    Alkaline Phosphatase 71 39 - 117 U/L    Total Bilirubin 1.9 (H) 0.0 - 1.2 mg/dL    Globulin 3.0 gm/dL    A/G Ratio 1.2 g/dL    BUN/Creatinine Ratio 21.4 7.0 - 25.0    Anion Gap 11.0 5.0 - 15.0 mmol/L    eGFR 89.8 >60.0 mL/min/1.73   Single High Sensitivity Troponin T    Collection Time: 03/07/23  4:52 PM    Specimen: Blood   Result Value Ref Range    HS Troponin T 19 (H) <15 ng/L   Magnesium    Collection Time: 03/07/23  4:52 PM    Specimen: Blood   Result Value Ref Range    Magnesium 1.7 1.6 - 2.4 mg/dL   Green Top (Gel)    Collection Time: 03/07/23  4:52 PM   Result Value Ref Range    Extra Tube Hold for add-ons.    Lavender Top    Collection Time: 03/07/23  4:52 PM   Result Value Ref Range    Extra Tube hold for add-on    Gold Top - SST    Collection Time: 03/07/23  4:52 PM   Result Value Ref Range    Extra Tube Hold for add-ons.    Light Blue Top    Collection Time: 03/07/23  4:52 PM   Result Value Ref Range    Extra Tube Hold for add-ons.    CBC Auto Differential    Collection Time: 03/07/23  4:52 PM    Specimen: Blood   Result Value Ref Range    WBC 11.31 (H) 3.40 - 10.80 10*3/mm3    RBC 3.48 (L) 4.14 - 5.80 10*6/mm3    Hemoglobin 11.1 (L) 13.0 - 17.7 g/dL    Hematocrit 31.9 (L) 37.5 - 51.0 %    MCV 91.7 79.0 - 97.0 fL    MCH 31.9 26.6 - 33.0 pg    MCHC 34.8 31.5 - 35.7 g/dL    RDW 12.5 12.3 - 15.4 %    RDW-SD 41.7 37.0 - 54.0 fl    MPV 9.9 6.0 - 12.0 fL    Platelets 300 140 - 450 10*3/mm3    Neutrophil % 81.6 (H) 42.7 - 76.0 %    Lymphocyte % 7.1 (L) 19.6 - 45.3 %    Monocyte % 9.3 5.0 - 12.0 %    Eosinophil % 0.6 0.3 - 6.2 %    Basophil % 0.4 0.0 - 1.5 %    Immature Grans % 1.0 (H) 0.0 - 0.5 %    Neutrophils, Absolute 9.24 (H) 1.70 - 7.00 10*3/mm3    Lymphocytes, Absolute 0.80 0.70 - 3.10 10*3/mm3    Monocytes, Absolute 1.05 (H) 0.10 - 0.90 10*3/mm3    Eosinophils, Absolute 0.07 0.00 - 0.40 10*3/mm3    Basophils, Absolute 0.04 0.00 - 0.20 10*3/mm3    Immature Grans, Absolute 0.11 (H)  0.00 - 0.05 10*3/mm3    nRBC 0.0 0.0 - 0.2 /100 WBC   Lactic Acid, Plasma    Collection Time: 03/07/23  4:52 PM    Specimen: Blood   Result Value Ref Range    Lactate 1.3 0.5 - 2.0 mmol/L   Procalcitonin    Collection Time: 03/07/23  4:52 PM    Specimen: Blood   Result Value Ref Range    Procalcitonin 0.08 0.00 - 0.25 ng/mL   Urinalysis With Microscopic If Indicated (No Culture) - Straight Cath    Collection Time: 03/07/23  5:08 PM    Specimen: Straight Cath; Urine   Result Value Ref Range    Color, UA Dark Yellow (A) Yellow, Straw    Appearance, UA Clear Clear    pH, UA 5.5 5.0 - 8.0    Specific Gravity, UA 1.029 1.001 - 1.030    Glucose, UA >=1000 mg/dL (3+) (A) Negative    Ketones, UA 15 mg/dL (1+) (A) Negative    Bilirubin, UA Small (1+) (A) Negative    Blood, UA Negative Negative    Protein, UA 30 mg/dL (1+) (A) Negative    Leuk Esterase, UA Negative Negative    Nitrite, UA Negative Negative    Urobilinogen, UA 1.0 E.U./dL 0.2 - 1.0 E.U./dL   Urinalysis, Microscopic Only - Straight Cath    Collection Time: 03/07/23  5:08 PM    Specimen: Straight Cath; Urine   Result Value Ref Range    RBC, UA 0-2 None Seen, 0-2 /HPF    WBC, UA 0-2 None Seen, 0-2 /HPF    Bacteria, UA None Seen None Seen, Trace /HPF    Squamous Epithelial Cells, UA 0-2 None Seen, 0-2 /HPF    Hyaline Casts, UA 0-6 0 - 6 /LPF    Methodology Automated Microscopy      Note: In addition to lab results from this visit, the labs listed above may include labs taken at another facility or during a different encounter within the last 24 hours. Please correlate lab times with ED admission and discharge times for further clarification of the services performed during this visit.    XR Chest 1 View   Final Result   Impression:   Elevation of the left hemidiaphragm with left basilar airspace opacities, which may be due to atelectasis and/or pneumonia.      Electronically Signed: Jodee Salcido     3/7/2023 5:54 PM EST     Workstation ID: [id number]        Vitals:  "   03/07/23 1644 03/07/23 1730 03/07/23 1745 03/07/23 1830   BP: 127/85 130/84 130/84 121/69   Pulse: (!) 122 (!) 123 118 116   Resp: 17  11    Temp: 99 °F (37.2 °C)      TempSrc: Oral      SpO2: 97% 98% 95% 99%   Weight: 69.4 kg (153 lb)      Height: 175.3 cm (69\")        Medications   sodium chloride 0.9 % flush 10 mL (has no administration in time range)   vancomycin 1500 mg/500 mL 0.9% NS IVPB ([facility]) (1,500 mg Intravenous New Bag 3/7/23 1927)   sodium chloride 0.9 % bolus 1,000 mL (1,000 mL Intravenous New Bag 3/7/23 1754)   piperacillin-tazobactam (ZOSYN) 3.375 g in iso-osmotic dextrose 50 ml (premix) (0 g Intravenous Stopped 3/7/23 1927)     ECG/EMG Results (last 24 hours)     Procedure Component Value Units Date/Time    ECG 12 Lead ED Triage Standing Order; Weak / Dizzy / AMS [[id number]] Collected: 03/07/23 1650     Updated: 03/07/23 1649     QT Interval 326 ms      QTC Interval 462 ms     Narrative:      Test Reason : AMS  Blood Pressure :   */*   mmHG  Vent. Rate : 121 BPM     Atrial Rate : 121 BPM     P-R Int : 152 ms          QRS Dur :  74 ms      QT Int : 326 ms       P-R-T Axes :  -2 -43   4 degrees     QTc Int : 462 ms    ** Poor data quality, interpretation may be adversely affected  Sinus tachycardia  Left axis deviation  Cannot rule out Anterior infarct , age undetermined  Abnormal ECG  No previous ECGs available    Referred By: ED MD           Confirmed By:         ECG 12 Lead ED Triage Standing Order; Weak / Dizzy / AMS   Preliminary Result   Test Reason : AMS   Blood Pressure :   */*   mmHG   Vent. Rate : 121 BPM     Atrial Rate : 121 BPM      P-R Int : 152 ms          QRS Dur :  74 ms       QT Int : 326 ms       P-R-T Axes :  -2 -43   4 degrees      QTc Int : 462 ms      ** Poor data quality, interpretation may be adversely affected   Sinus tachycardia   Left axis deviation   Cannot rule out Anterior infarct , age undetermined   Abnormal ECG   No previous ECGs available      Referred By: ED MD    "        Confirmed By:                 Medical Decision Making  Patient's second visit in 24 hours.  Worsen mental status changes at the facility abnormal chest x-ray.  Suspected mild dehydration.  I discussed the case with internal medicine.  Patient be admitted for further evaluation and management.    Altered mental status, unspecified altered mental status type: complicated acute illness or injury  At high risk for falls: complicated acute illness or injury  Failure of outpatient treatment: complicated acute illness or injury  Fall, initial encounter: complicated acute illness or injury  Pneumonia of left lower lobe due to infectious organism: complicated acute illness or injury  Amount and/or Complexity of Data Reviewed  Independent Historian: EMS  External Data Reviewed: notes.  Labs: ordered. Decision-making details documented in ED Course.  Radiology: ordered and independent interpretation performed. Decision-making details documented in ED Course.  ECG/medicine tests: ordered and independent interpretation performed. Decision-making details documented in ED Course.      Risk  Prescription drug management.  Decision regarding hospitalization.          Final diagnoses:   Altered mental status, unspecified altered mental status type   Fall, initial encounter   Pneumonia of left lower lobe due to infectious organism   Failure of outpatient treatment   At high risk for falls       ED Disposition  ED Disposition     ED Disposition   Decision to Admit    Condition   --    Comment   Level of Care: Telemetry [5]   Diagnosis: Altered mental status, unspecified altered mental status type [[id number]]                  Rudy Avilez DO  03/07/23 1952

## 2023-03-08 ENCOUNTER — READMISSION MANAGEMENT (OUTPATIENT)
Dept: CALL CENTER | Facility: HOSPITAL | Age: 78
End: 2023-03-08
Payer: MEDICARE

## 2023-03-08 VITALS
BODY MASS INDEX: 22.66 KG/M2 | DIASTOLIC BLOOD PRESSURE: 71 MMHG | OXYGEN SATURATION: 96 % | HEART RATE: 130 BPM | TEMPERATURE: 99.9 F | WEIGHT: 153 LBS | RESPIRATION RATE: 18 BRPM | SYSTOLIC BLOOD PRESSURE: 104 MMHG | HEIGHT: 69 IN

## 2023-03-08 PROBLEM — R00.0 TACHYCARDIA: Status: ACTIVE | Noted: 2023-03-08

## 2023-03-08 LAB
ANION GAP SERPL CALCULATED.3IONS-SCNC: 12 MMOL/L (ref 5–15)
B PARAPERT DNA SPEC QL NAA+PROBE: NOT DETECTED
B PERT DNA SPEC QL NAA+PROBE: NOT DETECTED
BASOPHILS # BLD AUTO: 0.06 10*3/MM3 (ref 0–0.2)
BASOPHILS NFR BLD AUTO: 0.5 % (ref 0–1.5)
BUN SERPL-MCNC: 15 MG/DL (ref 8–23)
BUN/CREAT SERPL: 18.1 (ref 7–25)
C PNEUM DNA NPH QL NAA+NON-PROBE: NOT DETECTED
CALCIUM SPEC-SCNC: 8.1 MG/DL (ref 8.6–10.5)
CHLORIDE SERPL-SCNC: 101 MMOL/L (ref 98–107)
CO2 SERPL-SCNC: 23 MMOL/L (ref 22–29)
CREAT SERPL-MCNC: 0.83 MG/DL (ref 0.76–1.27)
DEPRECATED RDW RBC AUTO: 41.1 FL (ref 37–54)
EGFRCR SERPLBLD CKD-EPI 2021: 90.1 ML/MIN/1.73
EOSINOPHIL # BLD AUTO: 0.04 10*3/MM3 (ref 0–0.4)
EOSINOPHIL NFR BLD AUTO: 0.3 % (ref 0.3–6.2)
ERYTHROCYTE [DISTWIDTH] IN BLOOD BY AUTOMATED COUNT: 12.1 % (ref 12.3–15.4)
FLUAV SUBTYP SPEC NAA+PROBE: NOT DETECTED
FLUBV RNA ISLT QL NAA+PROBE: NOT DETECTED
GLUCOSE BLDC GLUCOMTR-MCNC: 220 MG/DL (ref 70–130)
GLUCOSE BLDC GLUCOMTR-MCNC: 263 MG/DL (ref 70–130)
GLUCOSE SERPL-MCNC: 194 MG/DL (ref 65–99)
HADV DNA SPEC NAA+PROBE: NOT DETECTED
HBA1C MFR BLD: 8.9 % (ref 4.8–5.6)
HCOV 229E RNA SPEC QL NAA+PROBE: NOT DETECTED
HCOV HKU1 RNA SPEC QL NAA+PROBE: NOT DETECTED
HCOV NL63 RNA SPEC QL NAA+PROBE: NOT DETECTED
HCOV OC43 RNA SPEC QL NAA+PROBE: NOT DETECTED
HCT VFR BLD AUTO: 32.6 % (ref 37.5–51)
HGB BLD-MCNC: 11.4 G/DL (ref 13–17.7)
HMPV RNA NPH QL NAA+NON-PROBE: NOT DETECTED
HPIV1 RNA ISLT QL NAA+PROBE: NOT DETECTED
HPIV2 RNA SPEC QL NAA+PROBE: NOT DETECTED
HPIV3 RNA NPH QL NAA+PROBE: NOT DETECTED
HPIV4 P GENE NPH QL NAA+PROBE: NOT DETECTED
IMM GRANULOCYTES # BLD AUTO: 0.17 10*3/MM3 (ref 0–0.05)
IMM GRANULOCYTES NFR BLD AUTO: 1.4 % (ref 0–0.5)
LYMPHOCYTES # BLD AUTO: 0.69 10*3/MM3 (ref 0.7–3.1)
LYMPHOCYTES NFR BLD AUTO: 5.7 % (ref 19.6–45.3)
M PNEUMO IGG SER IA-ACNC: NOT DETECTED
MCH RBC QN AUTO: 32.2 PG (ref 26.6–33)
MCHC RBC AUTO-ENTMCNC: 35 G/DL (ref 31.5–35.7)
MCV RBC AUTO: 92.1 FL (ref 79–97)
MONOCYTES # BLD AUTO: 1.25 10*3/MM3 (ref 0.1–0.9)
MONOCYTES NFR BLD AUTO: 10.4 % (ref 5–12)
MRSA DNA SPEC QL NAA+PROBE: POSITIVE
NEUTROPHILS NFR BLD AUTO: 81.7 % (ref 42.7–76)
NEUTROPHILS NFR BLD AUTO: 9.86 10*3/MM3 (ref 1.7–7)
NRBC BLD AUTO-RTO: 0 /100 WBC (ref 0–0.2)
OSMOLALITY UR: 797 MOSM/KG (ref 300–1100)
PLATELET # BLD AUTO: 318 10*3/MM3 (ref 140–450)
PMV BLD AUTO: 10.4 FL (ref 6–12)
POTASSIUM SERPL-SCNC: 3.9 MMOL/L (ref 3.5–5.2)
RBC # BLD AUTO: 3.54 10*6/MM3 (ref 4.14–5.8)
RHINOVIRUS RNA SPEC NAA+PROBE: NOT DETECTED
RSV RNA NPH QL NAA+NON-PROBE: NOT DETECTED
SARS-COV-2 RNA NPH QL NAA+NON-PROBE: NOT DETECTED
SODIUM SERPL-SCNC: 136 MMOL/L (ref 136–145)
WBC NRBC COR # BLD: 12.07 10*3/MM3 (ref 3.4–10.8)

## 2023-03-08 PROCEDURE — 83036 HEMOGLOBIN GLYCOSYLATED A1C: CPT | Performed by: NURSE PRACTITIONER

## 2023-03-08 PROCEDURE — 96361 HYDRATE IV INFUSION ADD-ON: CPT

## 2023-03-08 PROCEDURE — 63710000001 INSULIN LISPRO (HUMAN) PER 5 UNITS: Performed by: NURSE PRACTITIONER

## 2023-03-08 PROCEDURE — 25010000002 LORAZEPAM PER 2 MG: Performed by: INTERNAL MEDICINE

## 2023-03-08 PROCEDURE — 99239 HOSP IP/OBS DSCHRG MGMT >30: CPT | Performed by: INTERNAL MEDICINE

## 2023-03-08 PROCEDURE — 80048 BASIC METABOLIC PNL TOTAL CA: CPT | Performed by: NURSE PRACTITIONER

## 2023-03-08 PROCEDURE — 25010000002 PIPERACILLIN SOD-TAZOBACTAM PER 1 G: Performed by: NURSE PRACTITIONER

## 2023-03-08 PROCEDURE — 0202U NFCT DS 22 TRGT SARS-COV-2: CPT | Performed by: NURSE PRACTITIONER

## 2023-03-08 PROCEDURE — 85025 COMPLETE CBC W/AUTO DIFF WBC: CPT | Performed by: NURSE PRACTITIONER

## 2023-03-08 PROCEDURE — 82962 GLUCOSE BLOOD TEST: CPT

## 2023-03-08 PROCEDURE — 87641 MR-STAPH DNA AMP PROBE: CPT | Performed by: NURSE PRACTITIONER

## 2023-03-08 PROCEDURE — G0378 HOSPITAL OBSERVATION PER HR: HCPCS

## 2023-03-08 RX ORDER — METOPROLOL SUCCINATE 25 MG/1
25 TABLET, EXTENDED RELEASE ORAL DAILY
Qty: 30 TABLET | Refills: 0 | Status: SHIPPED | OUTPATIENT
Start: 2023-03-08

## 2023-03-08 RX ORDER — LORAZEPAM 2 MG/ML
0.25 INJECTION INTRAMUSCULAR EVERY 4 HOURS PRN
Status: DISCONTINUED | OUTPATIENT
Start: 2023-03-08 | End: 2023-03-08 | Stop reason: HOSPADM

## 2023-03-08 RX ADMIN — Medication 2000 UNITS: at 08:23

## 2023-03-08 RX ADMIN — FAMOTIDINE 40 MG: 20 TABLET ORAL at 08:23

## 2023-03-08 RX ADMIN — METOPROLOL TARTRATE 12.5 MG: 25 TABLET, FILM COATED ORAL at 12:13

## 2023-03-08 RX ADMIN — DONEPEZIL HYDROCHLORIDE 10 MG: 10 TABLET, FILM COATED ORAL at 08:23

## 2023-03-08 RX ADMIN — APIXABAN 5 MG: 5 TABLET, FILM COATED ORAL at 08:23

## 2023-03-08 RX ADMIN — Medication 250 MG: at 09:20

## 2023-03-08 RX ADMIN — INSULIN LISPRO 3 UNITS: 100 INJECTION, SOLUTION INTRAVENOUS; SUBCUTANEOUS at 08:24

## 2023-03-08 RX ADMIN — SERTRALINE 50 MG: 50 TABLET, FILM COATED ORAL at 09:20

## 2023-03-08 RX ADMIN — MEMANTINE 10 MG: 10 TABLET ORAL at 08:23

## 2023-03-08 RX ADMIN — LOSARTAN POTASSIUM 25 MG: 25 TABLET, FILM COATED ORAL at 08:23

## 2023-03-08 RX ADMIN — CYANOCOBALAMIN TAB 1000 MCG 1000 MCG: 1000 TAB at 09:20

## 2023-03-08 RX ADMIN — TAZOBACTAM SODIUM AND PIPERACILLIN SODIUM 3.38 G: 375; 3 INJECTION, SOLUTION INTRAVENOUS at 08:23

## 2023-03-08 RX ADMIN — CETIRIZINE HYDROCHLORIDE 10 MG: 10 TABLET, FILM COATED ORAL at 08:23

## 2023-03-08 RX ADMIN — SODIUM CHLORIDE 500 ML: 9 INJECTION, SOLUTION INTRAVENOUS at 12:14

## 2023-03-08 RX ADMIN — INSULIN LISPRO 4 UNITS: 100 INJECTION, SOLUTION INTRAVENOUS; SUBCUTANEOUS at 12:14

## 2023-03-08 NOTE — DISCHARGE SUMMARY
[facility] Medicine Services  DISCHARGE SUMMARY    Patient Name: Stephen Zarate  : 1945  MRN: [id number]    Date of Admission: 3/7/2023  4:56 PM  Date of Discharge:  3/7/2023  Primary Care Physician: Hunter Gutierres APRN    Consults     No orders found for last 30 day(s).          Hospital Course     Presenting Problem:   Altered mental status, unspecified altered mental status type [R41.82]    Active Hospital Problems    Diagnosis  POA   • **Tachycardia [R00.0]  Unknown   • Altered mental status, unspecified altered mental status type [R41.82]  Yes   • History of recent fall [Z91.81]  Not Applicable   • Hyponatremia [E87.1]  Unknown   • Leukocytosis [D72.829]  Unknown   • Vascular dementia with behavior disturbance [F01.518]  Yes      Resolved Hospital Problems   No resolved problems to display.          Hospital Course:  Stephen Zarate is a 77 y.o. male with vascular dementia and frequent falls.  He lives at [facility].  He has an attentive daughter.  Last evening he slid out of bed, was thought to have pelvic injury, was transferred to [facility] ED.  Workup was benign and he was returned to [facility].  However, there was then some concern for slurred speech and altered mental status and a single BP reading of 90/80, so  sent him back to [facility] ED.  Daughter was not present and patient was admitted. HR was 120s on admission.  Concern was for sepsis.      The patient himself is at his baseline according to his daughter, but agitated by hospital environment.  No evidence of infection is found.  UA, CXR are clear; he is afebrile. He has eaten.  His daughter strongly wants to take him home.      Tachycardia:  Tachycardia persists:  100-130 during his stay, appears to be sinus tach. He has a history of Afib and is on Eliquis but no rate control agents.  He does not follow with a cardiologist.  He has no complaints of chest pain or dyspnea, but his HR remains 130 currently despite  getting fluids in ED and on floor.  He is having diarrhea which is not melanotic.    - After much discussion with daughter, who is very appropriately concerned re father's mental state, we have settled on the following plan:    - Change losartan to toprol for rate control  - daughter will continue to encourage po intake at home  - dtr will arrange for Hgb tomorrow to ensure there is no spontaneous bleed in setting of eliquis   - If tachycardia persists, dtr will obtain a cardiologist for him within the next few days.     Given that she wants to prioritize his mental health and understands the risks and will follow up quickly, I thinks risk v benefit favors discharge.     Discharge Follow Up Recommendations for outpatient labs/diagnostics:   *FU with hemoglobin tomorrow at facility:  Dtr will arrange.  She has mychart and will FU on results.    * FU with PCP, see cardiologist if tachycardia persists over next few days; consider echocardiogram     Day of Discharge     HPI:   He smiles and waves. Dtr in room is satisfied with plan     Review of Systems  UTO     Vital Signs:   Temp:  [98.1 °F (36.7 °C)-99.9 °F (37.7 °C)] 99.9 °F (37.7 °C)  Heart Rate:  [101-130] 130  Resp:  [11-18] 18  BP: (104-138)/(69-85) 104/71      Physical Exam:  Gen:  Elderly frail man in NAD.    Neuro: alert , BROWN,   HEENT:  NC/AT PERRL, OP benign  Lungs CTA not labored   Neck:  Supple, no LAD  Heart tachy RR no murmur, rub, or gallop  Abd:  Soft, nontender, no rebound or guarding, pos BS  Extrem:  No c/c/e  Buttocks;  No ecchymosis seen       Pertinent  and/or Most Recent Results     LAB RESULTS:      Lab 03/08/23  0328 03/07/23  2222 03/07/23  1652   WBC 12.07*  --  11.31*   HEMOGLOBIN 11.4*  --  11.1*   HEMATOCRIT 32.6*  --  31.9*   PLATELETS 318  --  300   NEUTROS ABS 9.86*  --  9.24*   IMMATURE GRANS (ABS) 0.17*  --  0.11*   LYMPHS ABS 0.69*  --  0.80   MONOS ABS 1.25*  --  1.05*   EOS ABS 0.04  --  0.07   MCV 92.1  --  91.7   PROCALCITONIN   --   --  0.09  0.08   LACTATE  --  1.2 1.3         Lab 03/08/23  0328 03/07/23  1652   SODIUM 136 135*   POTASSIUM 3.9 3.9   CHLORIDE 101 101   CO2 23.0 23.0   ANION GAP 12.0 11.0   BUN 15 18   CREATININE 0.83 0.84   EGFR 90.1 89.8   GLUCOSE 194* 321*   CALCIUM 8.1* 8.5*   MAGNESIUM  --  1.7   HEMOGLOBIN A1C 8.90*  --    TSH  --  1.990         Lab 03/07/23  1652   TOTAL PROTEIN 6.5   ALBUMIN 3.5   GLOBULIN 3.0   ALT (SGPT) 27   AST (SGOT) 28   BILIRUBIN 1.9*   ALK PHOS 71         Lab 03/07/23  1652   HSTROP T 19*                 Brief Urine Lab Results  (Last result in the past 365 days)      Color   Clarity   Blood   Leuk Est   Nitrite   Protein   CREAT   Urine HCG        03/07/23 1708 Dark Yellow   Clear   Negative   Negative   Negative   30 mg/dL (1+)               Microbiology Results (last 10 days)     Procedure Component Value - Date/Time    Respiratory Panel PCR w/COVID-19(SARS-CoV-2) JESSI/REY/CLARI/PAD/COR/MAD/SHO In-House, NP Swab in UTM/VTM, 3-4 HR TAT - Swab, Nasopharynx [[id number]]  (Normal) Collected: 03/08/23 0408    Lab Status: Final result Specimen: Swab from Nasopharynx Updated: 03/08/23 0533     ADENOVIRUS, PCR Not Detected     Coronavirus 229E Not Detected     Coronavirus HKU1 Not Detected     Coronavirus NL63 Not Detected     Coronavirus OC43 Not Detected     COVID19 Not Detected     Human Metapneumovirus Not Detected     Human Rhinovirus/Enterovirus Not Detected     Influenza A PCR Not Detected     Influenza B PCR Not Detected     Parainfluenza Virus 1 Not Detected     Parainfluenza Virus 2 Not Detected     Parainfluenza Virus 3 Not Detected     Parainfluenza Virus 4 Not Detected     RSV, PCR Not Detected     Bordetella pertussis pcr Not Detected     Bordetella parapertussis PCR Not Detected     Chlamydophila pneumoniae PCR Not Detected     Mycoplasma pneumo by PCR Not Detected    Narrative:      In the setting of a positive respiratory panel with a viral infection PLUS a negative procalcitonin  without other underlying concern for bacterial infection, consider observing off antibiotics or discontinuation of antibiotics and continue supportive care. If the respiratory panel is positive for atypical bacterial infection (Bordetella pertussis, Chlamydophila pneumoniae, or Mycoplasma pneumoniae), consider antibiotic de-escalation to target atypical bacterial infection.    MRSA Screen, PCR (Inpatient) - Swab, Nares [[id number]]  (Abnormal) Collected: 03/08/23 0408    Lab Status: Final result Specimen: Swab from Nares Updated: 03/08/23 0558     MRSA PCR Positive    Narrative:      The negative predictive value of this diagnostic test is high and should only be used to consider de-escalating anti-MRSA therapy. A positive result may indicate colonization with MRSA and must be correlated clinically.          CT Head Without Contrast    Result Date: 3/7/2023  EXAMINATION:  CT HEAD WO CONTRAST DATE: 3/7/2023 4:15 AM  INDICATION:  Fall  COMPARISON: Brain MRI August 2, 2017.  TECHNIQUE:  Routine axial images through the head without contrast. Low-dose CT acquisition technique included one or more of the following options: 1. Automated exposure control, 2. Adjustment of mA and/or KV according to patient's size and/or 3. Use of iterative reconstruction. FINDINGS:  Intracranial Contents: Moderate generalized volume loss. Extensive hypodensities throughout the cerebral white matter, most commonly related to chronic small vessel ischemic changes. This is similar to the MRI in 2017. Slight encephalomalacia of both occipital lobes.  No acute  intracranial hemorrhage. No mass effect, midline shift, hydrocephalus, herniation, or extra axial fluid collection.  Bones and Extracranial Soft Tissues: The calvarium is intact. Normal extracranial soft tissues. Mucosal thickening in the ethmoid air cells and inferior maxillary sinuses. The mastoid air cells are well aerated. Prior cataract surgery. Distal internal carotid artery  atherosclerotic calcifications.     1. No acute intracranial abnormality. 2. Moderate volume loss and markedly severe chronic small vessel ischemic changes, similar to the MRI on August 2, 2017. Electronically signed by:  Anthony Chavez M.D.  3/7/2023 2:52 AM Mountain Time    CT Pelvis Without Contrast    Result Date: 3/7/2023  EXAMINATION: CT scan pelvis without IV contrast. INDICATION: Fall and left hip pain PROCEDURE: Axial, coronal and sagittal reconstructions were obtained.  CT dose lowering techniques were used, to include: automated exposure control, adjustment for patient size, and or use of iterative reconstruction. COMPARISON: None FINDINGS: No fracture. No dislocation. Joint spaces are well-preserved. Soft tissues are normal.     Normal CT examination of the pelvis. No fracture or dislocation. Electronically signed by:  Zachariah Gracia M.D.  3/7/2023 2:46 AM Mountain Time    XR Chest 1 View    Result Date: 3/7/2023  XR CHEST 1 VW Date of Exam: 3/7/2023 5:40 PM EST Indication: Weak/Dizzy/AMS triage protocol. Comparison: None available. Findings: The left hemidiaphragm is elevated, and there are left basilar airspace opacities. Right lung appears clear. No pneumothorax is seen. Heart size is normal.     Impression: Elevation of the left hemidiaphragm with left basilar airspace opacities, which may be due to atelectasis and/or pneumonia. Electronically Signed: Jodee Salcido  3/7/2023 5:54 PM EST  Workstation ID: [id number]      Plan for Follow-up of Pending Labs/Results: I will FU   Pending Labs     Order Current Status    Blood Culture - Blood, Arm, Right In process    Blood Culture - Blood, Hand, Right In process        Discharge Details        Discharge Medications      New Medications      Instructions Start Date   metoprolol succinate XL 25 MG 24 hr tablet  Commonly known as: Toprol XL   25 mg, Oral, Daily         Continue These Medications      Instructions Start Date   ammonium lactate 12 %  lotion  Commonly known as: LAC-HYDRIN   Topical, As Needed      apixaban 5 MG tablet tablet  Commonly known as: ELIQUIS   5 mg, Oral, 2 Times Daily      atorvastatin 40 MG tablet  Commonly known as: LIPITOR   40 mg, Oral, Daily      cetirizine 10 MG tablet  Commonly known as: zyrTEC   1 tablet, Oral      Comfort EZ Pen Needles 31G X 8 MM misc  Generic drug: Insulin Pen Needle   No dose, route, or frequency recorded.      donepezil 10 MG tablet  Commonly known as: ARICEPT   TAKE 1 TABLET BY MOUTH ONCE DAILY      Easy Touch Lancets 28G misc   No dose, route, or frequency recorded.      famotidine 40 MG tablet  Commonly known as: PEPCID   40 mg, Oral, Daily      glimepiride 4 MG tablet  Commonly known as: AMARYL   Oral      glucose blood test strip   1 each, Other, As Needed, Use as instructed       glucose blood test strip   Does not apply, Every 12 Hours      HUMALOG KWIKPEN SC   Subcutaneous      Jardiance 25 MG tablet tablet  Generic drug: empagliflozin   No dose, route, or frequency recorded.      memantine 10 MG tablet  Commonly known as: NAMENDA   10 mg, Oral, 2 Times Daily      pioglitazone 15 MG tablet  Commonly known as: ACTOS   1 tablet, Oral      potassium chloride 10 MEQ CR tablet  Commonly known as: K-DUR,KLOR-CON   10 mEq, Oral, 2 Times Daily      saccharomyces boulardii 250 MG capsule  Commonly known as: FLORASTOR   250 mg, Oral, 2 Times Daily      saccharomyces boulardii 250 MG capsule  Commonly known as: FLORASTOR   TAKE ONE CAPSULE BY MOUTH TWO TIMES A DAY      sertraline 50 MG tablet  Commonly known as: Zoloft   50 mg, Oral, Daily      SITagliptin 100 MG tablet  Commonly known as: JANUVIA   100 mg, Oral, Daily      Tresiba FlexTouch 200 UNIT/ML solution pen-injector pen injection  Generic drug: Insulin Degludec   No dose, route, or frequency recorded.      triamcinolone 0.1 % cream  Commonly known as: KENALOG   1 application, Topical, 2 Times Daily      vitamin B-12 1000 MCG tablet  Commonly known  as: CYANOCOBALAMIN   1,000 mcg, Oral, Daily      Vitamin D3 50 MCG (2000 UT) capsule   No dose, route, or frequency recorded.         Stop These Medications    losartan 25 MG tablet  Commonly known as: COZAAR            Allergies   Allergen Reactions   • Inositol Niacinate Hives   • Niacin Hives   • Shellfish-Derived Products Unknown - Low Severity   • Sulfa Antibiotics          Discharge Disposition:  Home or Self Care    Diet:  Hospital:  Diet Order   Procedures   • Diet: Cardiac Diets, Diabetic Diets; Healthy Heart (2-3 Na+); Consistent Carbohydrate; Texture: Regular Texture (IDDSI 7); Fluid Consistency: Thin (IDDSI 0)       Activity:      Restrictions or Other Recommendations:  CODE STATUS:    Code Status and Medical Interventions:   Ordered at: 03/07/23 5344     Level Of Support Discussed With:    Health Care Surrogate     Code Status (Patient has no pulse and is not breathing):    CPR (Attempt to Resuscitate)     Medical Interventions (Patient has pulse or is breathing):    Full Support     Release to patient:    Routine Release       Future Appointments   Date Time Provider Department Center   5/10/2023 10:30 AM Rama Billingsley APRN [facility]       Additional Instructions for the Follow-ups that You Need to Schedule     Discharge Follow-up with PCP   As directed       Currently Documented PCP:    Hunter Gutierres APRN    PCP Phone Number:    [phone]     Follow Up Details: 3 days                     Sima Andrade MD  03/08/23      Time Spent on Discharge:  I spent  60  minutes on this discharge activity which included: face-to-face encounter with the patient, reviewing the data in the system, coordination of the care with the nursing staff as well as consultants, documentation, and entering orders.

## 2023-03-08 NOTE — NURSING NOTE
Report called to adelaide beavers at [facility], informed that daughter was transporting patient back to facility. Iv's d/capri and tolerated well. nad noted and vss at this time. Patient is still slitghly anxious and wanting to get home and back to his bed.

## 2023-03-08 NOTE — CASE MANAGEMENT/SOCIAL WORK
Discharge Planning Assessment  [facility]     Patient Name: Stephen Zarate  MRN: [id number]  Today's Date: 3/8/2023    Admit Date: 3/7/2023    Plan: Return to [facility]   Discharge Needs Assessment     Row Name 03/08/23 1224       Living Environment    People in Home facility resident    Unique Family Situation Lives at [facility]    Current Living Arrangements assisted living facility    Potentially Unsafe Housing Conditions none    Primary Care Provided by other (see comments)  facility staff    Provides Primary Care For no one    Family Caregiver if Needed child(kurt), adult    Family Caregiver Names daughter Mariah Prattgg    Quality of Family Relationships helpful;involved;supportive    Able to Return to Prior Arrangements yes       Resource/Environmental Concerns    Resource/Environmental Concerns none    Transportation Concerns none  daughter to transport       Food Insecurity    Within the past 12 months, you worried that your food would run out before you got the money to buy more. Never true    Within the past 12 months, the food you bought just didn't last and you didn't have money to get more. Never true       Transition Planning    Patient/Family Anticipates Transition to long-term care facility    Patient/Family Anticipated Services at Transition     Transportation Anticipated family or friend will provide       Discharge Needs Assessment    Equipment Currently Used at Home cane, straight    Concerns to be Addressed denies needs/concerns at this time    Anticipated Changes Related to Illness inability to care for self    Equipment Needed After Discharge none               Discharge Plan     Row Name 03/08/23 1231       Plan    Plan Return to [facility]    Patient/Family in Agreement with Plan yes    Plan Comments I spoke with the medical care team, nurses, and patient's daughter. The daughter desires to take the patient back to [facility] today. Daughter and  HCS, Mariah Cox, will transport him back to his facility.    Final Discharge Disposition Code 01 - home or self-care              Continued Care and Services - Admitted Since 3/7/2023    Coordination has not been started for this encounter.       Expected Discharge Date and Time     Expected Discharge Date Expected Discharge Time    Mar 8, 2023          Demographic Summary    No documentation.                Functional Status     Row Name 03/08/23 1223       Functional Status    Usual Activity Tolerance fair    Current Activity Tolerance poor       Physical Activity    On average, how many days per week do you engage in moderate to strenuous exercise (like a brisk walk)? 0 days    On average, how many minutes do you engage in exercise at this level? 0 min    Number of minutes of exercise per week 0       Assessment of Health Literacy    How often do you have someone help you read hospital materials? Always    How often do you have problems learning about your medical condition because of difficulty understanding written information? Always    How often do you have a problem understanding what is told to you about your medical condition? Always    How confident are you filling out medical forms by yourself? Not at all    Health Literacy Low       Functional Status, IADL    Medications completely dependent    Meal Preparation completely dependent    Housekeeping completely dependent    Laundry completely dependent    Shopping completely dependent       Mental Status Summary    Recent Changes in Mental Status/Cognitive Functioning unable to assess               Psychosocial    No documentation.                Abuse/Neglect    No documentation.                Legal    No documentation.                Substance Abuse    No documentation.                Patient Forms    No documentation.                   Joann Ulrich RN

## 2023-03-08 NOTE — NURSING NOTE
Daughter is here at the bedside and is extremely upset that patient was admitted and and was not called. To inform her of status. Daughter is complaining and becoming loud at nurses station, daughter was pulled aside to speak to in private in the conference room, outside of the patient room so that patient's anxiety did not increase and to remove from hallway so that no PHI was disclosed.

## 2023-03-08 NOTE — CASE MANAGEMENT/SOCIAL WORK
Continued Stay Note  [facility]     Patient Name: Stephen Zarate  MRN: [id number]  Today's Date: 3/8/2023    Admit Date: 3/7/2023    Plan: [facility]   Discharge Plan     Row Name 03/08/23 1304       Plan    Plan [facility]    Plan Comments I spoke with pt's daughter, Mariah, at the bedside. We discussed pt's discharge today. Mariah states that she spoke with the Director of Nursing at [facility], Louisa, about pt returning today. Louisa told the daughter that as long as a pt is returning within 48 hours, from being brought to the hospital, that [facility] does not need to reassess the pt. Ludin and her  will be transporting pt at discharge. No needs voiced or identified.    Final Discharge Disposition Code 01 - home or self-care    Row Name 03/08/23 1231       Plan    Plan Return to [facility]    Patient/Family in Agreement with Plan yes    Plan Comments I spoke with the medical care team, nurses, and patient's daughter. The daughter desires to take the patient back to [facility] today. Daughter and HCS, Mariah Cox, will transport him back to his facility.    Final Discharge Disposition Code 01 - home or self-care               Discharge Codes    No documentation.               Expected Discharge Date and Time     Expected Discharge Date Expected Discharge Time    Mar 8, 2023             Jing Barcenas RN

## 2023-03-08 NOTE — DISCHARGE PLACEMENT REQUEST
"Kevin Zarate (77 y.o. Male)     Date of Birth   1945    Social Security Number       Address   [address] [place] [address]    Home Phone   [phone]    MRN   [id number]       [facility]    Marital Status                               Admission Date   3/7/23    Admission Type   Emergency    Admitting Provider   Sima Andrade MD    Attending Provider       Department, Room/Bed   [address], S549/1       Discharge Date   3/8/2023    Discharge Disposition   Home or Self Care    Discharge Destination                               Attending Provider: (none)   Allergies: Inositol Niacinate, Niacin, Shellfish-derived Products, Sulfa Antibiotics    Isolation: None   Infection: MRSA (23)   Code Status: CPR    Ht: 175.3 cm (69\")   Wt: 69.4 kg (153 lb)    Admission Cmt: None   Principal Problem: Tachycardia [R00.0]                 Active Insurance as of 3/7/2023     Primary Coverage     Payor Plan Insurance Group Employer/Plan Group    HUMANA MEDICARE REPLACEMENT HUMANA MEDICARE REPLACEMENT [id number]     Payor Plan Address Payor Plan Phone Number Payor Plan Fax Number Effective Dates    [address] [address] [phone]  2015 - None Entered    [facility] [address]       Subscriber Name Subscriber Birth Date Member ID       KEVIN ZARATE 1945 [id number]                 Emergency Contacts      (Rel.) Home Phone Work Phone Mobile Phone    Mariah Cox (Daughter) [phone] -- [phone]               Discharge Summary      Sima Andrade MD at [address] [place] [address] Medicine Services  DISCHARGE SUMMARY    Patient Name: Kevin Zarate  : 1945  MRN: [id number]    Date of Admission: 3/7/2023  4:56 PM  Date of Discharge:  3/7/2023  Primary Care Physician: Hunter Gutierres, APRN    Consults     No orders found for last 30 day(s).          Hospital Course     Presenting Problem:   Altered mental " status, unspecified altered mental status type [R41.82]    Active Hospital Problems    Diagnosis  POA   • **Tachycardia [R00.0]  Unknown   • Altered mental status, unspecified altered mental status type [R41.82]  Yes   • History of recent fall [Z91.81]  Not Applicable   • Hyponatremia [E87.1]  Unknown   • Leukocytosis [D72.829]  Unknown   • Vascular dementia with behavior disturbance [F01.518]  Yes      Resolved Hospital Problems   No resolved problems to display.          Hospital Course:  Stephen Zarate is a 77 y.o. male with vascular dementia and frequent falls.  He lives at [facility].  He has an attentive daughter.  Last evening he slid out of bed, was thought to have pelvic injury, was transferred to [facility] ED.  Workup was benign and he was returned to [facility].  However, there was then some concern for slurred speech and altered mental status and a single BP reading of 90/80, so  sent him back to [facility] ED.  Daughter was not present and patient was admitted. HR was 120s on admission.  Concern was for sepsis.      The patient himself is at his baseline according to his daughter, but agitated by hospital environment.  No evidence of infection is found.  UA, CXR are clear; he is afebrile. He has eaten.  His daughter strongly wants to take him home.      Tachycardia:  Tachycardia persists:  100-130 during his stay, appears to be sinus tach. He has a history of Afib and is on Eliquis but no rate control agents.  He does not follow with a cardiologist.  He has no complaints of chest pain or dyspnea, but his HR remains 130 currently despite getting fluids in ED and on floor.  He is having diarrhea which is not melanotic.    - After much discussion with daughter, who is very appropriately concerned re father's mental state, we have settled on the following plan:    - Change losartan to toprol for rate control  - daughter will continue to encourage po intake at home  - dtr will arrange for Hgb tomorrow to ensure  there is no spontaneous bleed in setting of eliquis   - If tachycardia persists, dtr will obtain a cardiologist for him within the next few days.     Given that she wants to prioritize his mental health and understands the risks and will follow up quickly, I thinks risk v benefit favors discharge.     Discharge Follow Up Recommendations for outpatient labs/diagnostics:   *FU with hemoglobin tomorrow at facility:  Dtr will arrange.  She has mychart and will FU on results.    * FU with PCP, see cardiologist if tachycardia persists over next few days; consider echocardiogram     Day of Discharge     HPI:   He smiles and waves. Dtr in room is satisfied with plan     Review of Systems  UTO     Vital Signs:   Temp:  [98.1 °F (36.7 °C)-99.9 °F (37.7 °C)] 99.9 °F (37.7 °C)  Heart Rate:  [101-130] 130  Resp:  [11-18] 18  BP: (104-138)/(69-85) 104/71      Physical Exam:  Gen:  Elderly frail man in NAD.    Neuro: alert , BROWN,   HEENT:  NC/AT PERRL, OP benign  Lungs CTA not labored   Neck:  Supple, no LAD  Heart tachy RR no murmur, rub, or gallop  Abd:  Soft, nontender, no rebound or guarding, pos BS  Extrem:  No c/c/e  Buttocks;  No ecchymosis seen       Pertinent  and/or Most Recent Results     LAB RESULTS:      Lab 03/08/23  0328 03/07/23  2222 03/07/23  1652   WBC 12.07*  --  11.31*   HEMOGLOBIN 11.4*  --  11.1*   HEMATOCRIT 32.6*  --  31.9*   PLATELETS 318  --  300   NEUTROS ABS 9.86*  --  9.24*   IMMATURE GRANS (ABS) 0.17*  --  0.11*   LYMPHS ABS 0.69*  --  0.80   MONOS ABS 1.25*  --  1.05*   EOS ABS 0.04  --  0.07   MCV 92.1  --  91.7   PROCALCITONIN  --   --  0.09  0.08   LACTATE  --  1.2 1.3         Lab 03/08/23  0328 03/07/23  1652   SODIUM 136 135*   POTASSIUM 3.9 3.9   CHLORIDE 101 101   CO2 23.0 23.0   ANION GAP 12.0 11.0   BUN 15 18   CREATININE 0.83 0.84   EGFR 90.1 89.8   GLUCOSE 194* 321*   CALCIUM 8.1* 8.5*   MAGNESIUM  --  1.7   HEMOGLOBIN A1C 8.90*  --    TSH  --  1.990         Lab 03/07/23  1652   TOTAL  PROTEIN 6.5   ALBUMIN 3.5   GLOBULIN 3.0   ALT (SGPT) 27   AST (SGOT) 28   BILIRUBIN 1.9*   ALK PHOS 71         Lab 03/07/23  1652   HSTROP T 19*                 Brief Urine Lab Results  (Last result in the past 365 days)      Color   Clarity   Blood   Leuk Est   Nitrite   Protein   CREAT   Urine HCG        03/07/23 1708 Dark Yellow   Clear   Negative   Negative   Negative   30 mg/dL (1+)               Microbiology Results (last 10 days)     Procedure Component Value - Date/Time    Respiratory Panel PCR w/COVID-19(SARS-CoV-2) JESSI/REY/CLARI/PAD/COR/MAD/SHO In-House, NP Swab in UTM/VTM, 3-4 HR TAT - Swab, Nasopharynx [[id number]]  (Normal) Collected: 03/08/23 0408    Lab Status: Final result Specimen: Swab from Nasopharynx Updated: 03/08/23 0533     ADENOVIRUS, PCR Not Detected     Coronavirus 229E Not Detected     Coronavirus HKU1 Not Detected     Coronavirus NL63 Not Detected     Coronavirus OC43 Not Detected     COVID19 Not Detected     Human Metapneumovirus Not Detected     Human Rhinovirus/Enterovirus Not Detected     Influenza A PCR Not Detected     Influenza B PCR Not Detected     Parainfluenza Virus 1 Not Detected     Parainfluenza Virus 2 Not Detected     Parainfluenza Virus 3 Not Detected     Parainfluenza Virus 4 Not Detected     RSV, PCR Not Detected     Bordetella pertussis pcr Not Detected     Bordetella parapertussis PCR Not Detected     Chlamydophila pneumoniae PCR Not Detected     Mycoplasma pneumo by PCR Not Detected    Narrative:      In the setting of a positive respiratory panel with a viral infection PLUS a negative procalcitonin without other underlying concern for bacterial infection, consider observing off antibiotics or discontinuation of antibiotics and continue supportive care. If the respiratory panel is positive for atypical bacterial infection (Bordetella pertussis, Chlamydophila pneumoniae, or Mycoplasma pneumoniae), consider antibiotic de-escalation to target atypical bacterial infection.     MRSA Screen, PCR (Inpatient) - Swab, Nares [[id number]]  (Abnormal) Collected: 03/08/23 0408    Lab Status: Final result Specimen: Swab from Nares Updated: 03/08/23 0558     MRSA PCR Positive    Narrative:      The negative predictive value of this diagnostic test is high and should only be used to consider de-escalating anti-MRSA therapy. A positive result may indicate colonization with MRSA and must be correlated clinically.          CT Head Without Contrast    Result Date: 3/7/2023  EXAMINATION:  CT HEAD WO CONTRAST DATE: 3/7/2023 4:15 AM  INDICATION:  Fall  COMPARISON: Brain MRI August 2, 2017.  TECHNIQUE:  Routine axial images through the head without contrast. Low-dose CT acquisition technique included one or more of the following options: 1. Automated exposure control, 2. Adjustment of mA and/or KV according to patient's size and/or 3. Use of iterative reconstruction. FINDINGS:  Intracranial Contents: Moderate generalized volume loss. Extensive hypodensities throughout the cerebral white matter, most commonly related to chronic small vessel ischemic changes. This is similar to the MRI in 2017. Slight encephalomalacia of both occipital lobes.  No acute  intracranial hemorrhage. No mass effect, midline shift, hydrocephalus, herniation, or extra axial fluid collection.  Bones and Extracranial Soft Tissues: The calvarium is intact. Normal extracranial soft tissues. Mucosal thickening in the ethmoid air cells and inferior maxillary sinuses. The mastoid air cells are well aerated. Prior cataract surgery. Distal internal carotid artery atherosclerotic calcifications.     1. No acute intracranial abnormality. 2. Moderate volume loss and markedly severe chronic small vessel ischemic changes, similar to the MRI on August 2, 2017. Electronically signed by:  Anthony Chavez M.D.  3/7/2023 2:52 AM Mountain Time    CT Pelvis Without Contrast    Result Date: 3/7/2023  EXAMINATION: CT scan pelvis without IV contrast.  INDICATION: Fall and left hip pain PROCEDURE: Axial, coronal and sagittal reconstructions were obtained.  CT dose lowering techniques were used, to include: automated exposure control, adjustment for patient size, and or use of iterative reconstruction. COMPARISON: None FINDINGS: No fracture. No dislocation. Joint spaces are well-preserved. Soft tissues are normal.     Normal CT examination of the pelvis. No fracture or dislocation. Electronically signed by:  Zachariah Garcia M.D.  3/7/2023 2:46 AM Mountain Time    XR Chest 1 View    Result Date: 3/7/2023  XR CHEST 1 VW Date of Exam: 3/7/2023 5:40 PM EST Indication: Weak/Dizzy/AMS triage protocol. Comparison: None available. Findings: The left hemidiaphragm is elevated, and there are left basilar airspace opacities. Right lung appears clear. No pneumothorax is seen. Heart size is normal.     Impression: Elevation of the left hemidiaphragm with left basilar airspace opacities, which may be due to atelectasis and/or pneumonia. Electronically Signed: Jodee Salcido  3/7/2023 5:54 PM EST  Workstation ID: [id number]      Plan for Follow-up of Pending Labs/Results: I will FU   Pending Labs     Order Current Status    Blood Culture - Blood, Arm, Right In process    Blood Culture - Blood, Hand, Right In process        Discharge Details        Discharge Medications      New Medications      Instructions Start Date   metoprolol succinate XL 25 MG 24 hr tablet  Commonly known as: Toprol XL   25 mg, Oral, Daily         Continue These Medications      Instructions Start Date   ammonium lactate 12 % lotion  Commonly known as: LAC-HYDRIN   Topical, As Needed      apixaban 5 MG tablet tablet  Commonly known as: ELIQUIS   5 mg, Oral, 2 Times Daily      atorvastatin 40 MG tablet  Commonly known as: LIPITOR   40 mg, Oral, Daily      cetirizine 10 MG tablet  Commonly known as: zyrTEC   1 tablet, Oral      Comfort EZ Pen Needles 31G X 8 MM misc  Generic drug: Insulin Pen Needle   No  dose, route, or frequency recorded.      donepezil 10 MG tablet  Commonly known as: ARICEPT   TAKE 1 TABLET BY MOUTH ONCE DAILY      Easy Touch Lancets 28G misc   No dose, route, or frequency recorded.      famotidine 40 MG tablet  Commonly known as: PEPCID   40 mg, Oral, Daily      glimepiride 4 MG tablet  Commonly known as: AMARYL   Oral      glucose blood test strip   1 each, Other, As Needed, Use as instructed       glucose blood test strip   Does not apply, Every 12 Hours      HUMALOG KWIKPEN SC   Subcutaneous      Jardiance 25 MG tablet tablet  Generic drug: empagliflozin   No dose, route, or frequency recorded.      memantine 10 MG tablet  Commonly known as: NAMENDA   10 mg, Oral, 2 Times Daily      pioglitazone 15 MG tablet  Commonly known as: ACTOS   1 tablet, Oral      potassium chloride 10 MEQ CR tablet  Commonly known as: K-DUR,KLOR-CON   10 mEq, Oral, 2 Times Daily      saccharomyces boulardii 250 MG capsule  Commonly known as: FLORASTOR   250 mg, Oral, 2 Times Daily      saccharomyces boulardii 250 MG capsule  Commonly known as: FLORASTOR   TAKE ONE CAPSULE BY MOUTH TWO TIMES A DAY      sertraline 50 MG tablet  Commonly known as: Zoloft   50 mg, Oral, Daily      SITagliptin 100 MG tablet  Commonly known as: JANUVIA   100 mg, Oral, Daily      Tresiba FlexTouch 200 UNIT/ML solution pen-injector pen injection  Generic drug: Insulin Degludec   No dose, route, or frequency recorded.      triamcinolone 0.1 % cream  Commonly known as: KENALOG   1 application, Topical, 2 Times Daily      vitamin B-12 1000 MCG tablet  Commonly known as: CYANOCOBALAMIN   1,000 mcg, Oral, Daily      Vitamin D3 50 MCG (2000 UT) capsule   No dose, route, or frequency recorded.         Stop These Medications    losartan 25 MG tablet  Commonly known as: COZAAR            Allergies   Allergen Reactions   • Inositol Niacinate Hives   • Niacin Hives   • Shellfish-Derived Products Unknown - Low Severity   • Sulfa Antibiotics           Discharge Disposition:  Home or Self Care    Diet:  Hospital:  Diet Order   Procedures   • Diet: Cardiac Diets, Diabetic Diets; Healthy Heart (2-3 Na+); Consistent Carbohydrate; Texture: Regular Texture (IDDSI 7); Fluid Consistency: Thin (IDDSI 0)       Activity:      Restrictions or Other Recommendations:  CODE STATUS:    Code Status and Medical Interventions:   Ordered at: 03/07/23 3044     Level Of Support Discussed With:    Health Care Surrogate     Code Status (Patient has no pulse and is not breathing):    CPR (Attempt to Resuscitate)     Medical Interventions (Patient has pulse or is breathing):    Full Support     Release to patient:    Routine Release       Future Appointments   Date Time Provider Department Center   5/10/2023 10:30 AM Rama Billingsley APRN [facility] LX[facility]       Additional Instructions for the Follow-ups that You Need to Schedule     Discharge Follow-up with PCP   As directed       Currently Documented PCP:    Hunter Gutierres APRN    PCP Phone Number:    [phone]     Follow Up Details: 3 days                     Sima Adnrade MD  03/08/23      Time Spent on Discharge:  I spent  60  minutes on this discharge activity which included: face-to-face encounter with the patient, reviewing the data in the system, coordination of the care with the nursing staff as well as consultants, documentation, and entering orders.            Electronically signed by Sima Andrade MD at 03/08/23 6152

## 2023-03-08 NOTE — PLAN OF CARE
Goal Outcome Evaluation:  Plan of Care Reviewed With: patient        Progress: no change  Outcome Evaluation: Patient admitted this shift to 5G. Vitals remains stable. Telemetry noted Sinus Tach. On room air. IVF infusing. Bed alarm in place for patient safety. Incontinence care provided. Patient able to turn and reposition himself in the bed. Will continue current plan of care and monitor for changes.

## 2023-03-08 NOTE — H&P
[facility] Medicine Services  HISTORY AND PHYSICAL    Patient Name: Stephen Zarate  : 1945  MRN: [id number]  Primary Care Physician: Hunter Gutierres APRN  Date of admission: 3/7/2023    Subjective   Subjective     Chief Complaint:  Altered mental status     HPI:  Stephen Zarate is a 77 y.o. male with a past medical history significant for atrial fibrillation on eliquis, vascular dementia, diabetes mellitus type 2, essential hypertension, hyperlipidemia and CVA presents to the ED from skilled nursing facility due to altered mental status.  Patient sustained af all last night in which he hit his head. He was brought to [facility] ED for further evaluation.  He underwent CT of head without contrast this morning that showed no acute intracranial abnormality, moderate volume loss and markedly severe chronic small vessel changes, similar to MRI in 2017.  Patient was then discharged back to the nursing facility.  Per nursing staff the patients mental status has been changing over the past 24 hours and this resulted in him being sent back to the ED for further evaluation.  Patient is unable to provide any information at this time due to advanced dementia.  Patient will be admitted to [facility] under the care of the Hospitalist for further evaluation and treatment.         Review of Systems   Unable to perform ROS: Dementia            Personal History     Past Medical History:   Diagnosis Date   • Atrial fibrillation (HCC)    • Dementia (HCC)    • Diabetes mellitus (HCC)    • Hyperlipidemia    • Hypertension    • Seizure (HCC)    • Stroke (HCC)              History reviewed. No pertinent surgical history.    Family History:  family history includes Alzheimer's disease in his father and mother; Dementia in his father and mother; Hypertension in his father and mother; Stroke in his mother.     Social History:  reports that he has never smoked. He has never used smokeless tobacco. He reports that he does  not drink alcohol and does not use drugs.  Social History     Social History Narrative   • Not on file       Medications:  Easy Touch Lancets 28G, Insulin Degludec, Insulin Lispro, Insulin Pen Needle, SITagliptin, Vitamin D3, ammonium lactate, apixaban, atorvastatin, cetirizine, donepezil, empagliflozin, famotidine, glimepiride, glucose blood, losartan, memantine, pioglitazone, potassium chloride, saccharomyces boulardii, sertraline, triamcinolone, and vitamin B-12    Allergies   Allergen Reactions   • Inositol Niacinate Hives   • Niacin Hives   • Shellfish-Derived Products Unknown - Low Severity   • Sulfa Antibiotics        Objective   Objective     Vital Signs:   Temp:  [99 °F (37.2 °C)-99.8 °F (37.7 °C)] 99 °F (37.2 °C)  Heart Rate:  [] 116  Resp:  [11-17] 11  BP: (111-130)/(69-88) 121/69    Physical Exam  Vitals and nursing note reviewed.   Constitutional:       General: He is not in acute distress.     Appearance: Normal appearance. He is not ill-appearing, toxic-appearing or diaphoretic.   HENT:      Head: Normocephalic and atraumatic.      Nose: Nose normal.      Mouth/Throat:      Mouth: Mucous membranes are dry.      Pharynx: Oropharynx is clear.   Eyes:      General: No scleral icterus.        Right eye: No discharge.         Left eye: No discharge.      Extraocular Movements: Extraocular movements intact.      Conjunctiva/sclera: Conjunctivae normal.      Pupils: Pupils are equal, round, and reactive to light.   Cardiovascular:      Rate and Rhythm: Normal rate and regular rhythm.      Pulses: Normal pulses.      Heart sounds: Normal heart sounds.   Pulmonary:      Effort: Pulmonary effort is normal.      Breath sounds: Normal breath sounds.   Abdominal:      General: Bowel sounds are normal. There is no distension.      Palpations: Abdomen is soft. There is no mass.      Tenderness: There is no abdominal tenderness. There is no right CVA tenderness, left CVA tenderness, guarding or rebound.       Hernia: No hernia is present.   Musculoskeletal:         General: No swelling, tenderness or deformity. Normal range of motion.      Cervical back: Normal range of motion and neck supple.      Right lower leg: No edema.      Left lower leg: No edema.   Skin:     General: Skin is warm and dry.   Neurological:      Mental Status: He is alert. He is disoriented.   Psychiatric:         Mood and Affect: Mood normal.         Result Review:  I have personally reviewed the results from the time of this admission to 3/7/2023 20:04 EST and agree with these findings:  [x]  Laboratory list / accordion  [x]  Microbiology  [x]  Radiology  [x]  EKG/Telemetry   []  Cardiology/Vascular   []  Pathology  []  Old records  []  Other:  Most notable findings include:     LAB RESULTS:      Lab 03/07/23  1652   WBC 11.31*   HEMOGLOBIN 11.1*   HEMATOCRIT 31.9*   PLATELETS 300   NEUTROS ABS 9.24*   IMMATURE GRANS (ABS) 0.11*   LYMPHS ABS 0.80   MONOS ABS 1.05*   EOS ABS 0.07   MCV 91.7   PROCALCITONIN 0.08   LACTATE 1.3         Lab 03/07/23  1652   SODIUM 135*   POTASSIUM 3.9   CHLORIDE 101   CO2 23.0   ANION GAP 11.0   BUN 18   CREATININE 0.84   EGFR 89.8   GLUCOSE 321*   CALCIUM 8.5*   MAGNESIUM 1.7         Lab 03/07/23  1652   TOTAL PROTEIN 6.5   ALBUMIN 3.5   GLOBULIN 3.0   ALT (SGPT) 27   AST (SGOT) 28   BILIRUBIN 1.9*   ALK PHOS 71         Lab 03/07/23  1652   HSTROP T 19*                 Brief Urine Lab Results  (Last result in the past 365 days)      Color   Clarity   Blood   Leuk Est   Nitrite   Protein   CREAT   Urine HCG        03/07/23 1708 Dark Yellow   Clear   Negative   Negative   Negative   30 mg/dL (1+)               Microbiology Results (last 10 days)     ** No results found for the last 240 hours. **          CT Head Without Contrast    Result Date: 3/7/2023  EXAMINATION:  CT HEAD WO CONTRAST DATE: 3/7/2023 4:15 AM  INDICATION:  Fall  COMPARISON: Brain MRI August 2, 2017.  TECHNIQUE:  Routine axial images through the head  without contrast. Low-dose CT acquisition technique included one or more of the following options: 1. Automated exposure control, 2. Adjustment of mA and/or KV according to patient's size and/or 3. Use of iterative reconstruction. FINDINGS:  Intracranial Contents: Moderate generalized volume loss. Extensive hypodensities throughout the cerebral white matter, most commonly related to chronic small vessel ischemic changes. This is similar to the MRI in 2017. Slight encephalomalacia of both occipital lobes.  No acute  intracranial hemorrhage. No mass effect, midline shift, hydrocephalus, herniation, or extra axial fluid collection.  Bones and Extracranial Soft Tissues: The calvarium is intact. Normal extracranial soft tissues. Mucosal thickening in the ethmoid air cells and inferior maxillary sinuses. The mastoid air cells are well aerated. Prior cataract surgery. Distal internal carotid artery atherosclerotic calcifications.     Impression: 1. No acute intracranial abnormality. 2. Moderate volume loss and markedly severe chronic small vessel ischemic changes, similar to the MRI on August 2, 2017. Electronically signed by:  Anthony Chavez M.D.  3/7/2023 2:52 AM Mountain Time    CT Pelvis Without Contrast    Result Date: 3/7/2023  EXAMINATION: CT scan pelvis without IV contrast. INDICATION: Fall and left hip pain PROCEDURE: Axial, coronal and sagittal reconstructions were obtained.  CT dose lowering techniques were used, to include: automated exposure control, adjustment for patient size, and or use of iterative reconstruction. COMPARISON: None FINDINGS: No fracture. No dislocation. Joint spaces are well-preserved. Soft tissues are normal.     Impression: Normal CT examination of the pelvis. No fracture or dislocation. Electronically signed by:  Zachariah Garcia M.D.  3/7/2023 2:46 AM Mountain Time    XR Chest 1 View    Result Date: 3/7/2023  XR CHEST 1 VW Date of Exam: 3/7/2023 5:40 PM EST Indication: Weak/Dizzy/AMS  triage protocol. Comparison: None available. Findings: The left hemidiaphragm is elevated, and there are left basilar airspace opacities. Right lung appears clear. No pneumothorax is seen. Heart size is normal.     Impression: Impression: Elevation of the left hemidiaphragm with left basilar airspace opacities, which may be due to atelectasis and/or pneumonia. Electronically Signed: Jodee Salcido  3/7/2023 5:54 PM EST  Workstation ID: [id number]          Assessment & Plan   Assessment & Plan       Altered mental status, unspecified altered mental status type    Vascular dementia with behavior disturbance    History of recent fall    Hyponatremia    Leukocytosis      77 year old male presents back to the ED from nursing facility due to altered mental status.     1) Altered mental status       Dementia  -Per nursing staff AMS worse since fall   -CT of head obtained at 4:18 this morning was negative   -UA negative  -obtain urine cultures   -blood cultures x2 pending   -fall precautions   -up with assistance   -hold any sedating medications    2) Leukocytosis   -check lactic acid and procal   -CXR with elevation of the left hemidiaphragm with left basilar airspace opacities which may be due to atelectasis and or pneumonia  -BC x2 pending   -continue vancomycin and zosyn   -sputum culture  -check respiratory PCR  -continuous pulse ox   -keep o2 sat >90%    3) Hyponatremia, mild  -likely due to volume depletion   -gentle IVF  -serial NA level   -check TSH, urina and serum osmolality     4) Vascular dementia   -on aricept, namenda   -fall precautions  -up with assistance     5) Diabetes mellitus type 2  -check hgb A1c   -start ldssi   -fingersticks achs     6) Paroxysmal atrial fibrillation   -on eliquis   -currently sinus tachycardia   -no prior echo in record     7) Hyperlipidemia  -continue statin           DVT prophylaxis:  eliquis     CODE STATUS: Full       Expected Discharge  TBD        This note has been completed as  part of a split-shared workflow.     Signature: Electronically signed by RENE Flores, 03/07/23, 8:16 PM EST.      Total time spent: 65 mins   Time spent includes time reviewing chart, face-to-face time, counseling patient/family/caregiver, ordering medications/tests/procedures, communicating with other health care professionals, documenting clinical information in the electronic health record, and coordination of care.          Attending   Admission Attestation       I have performed an independent face-to-face diagnostic evaluation including performing an independent physical examination as documented here.  The documented plan of care above was reviewed and developed with the advanced practice clinician (APC).      Brief Summary Statement:   Stephen Zarate is a 77 y.o. male with a history of dementia CVA who is presenting from a skilled nursing facility because of altered mental status.  Per report he had fallen a day before and the facility has sent him back for persistent altered mental status.  I did discuss in person with his daughter who is quite upset that he was admitted.  According to daughter he is currently at his baseline and she is worried about hospital admission aggravating his mental status.  We discussed his clinical findings and initial concern for pneumonia.  Agreed upon observation overnight and reassessment in the morning  Remainder of detailed HPI is as noted by APC and has been reviewed and/or edited by me for completeness.    Attending Physical Exam:  Temp:  [99 °F (37.2 °C)-99.8 °F (37.7 °C)] 99 °F (37.2 °C)  Heart Rate:  [] 116  Resp:  [11-17] 11  BP: (111-130)/(69-88) 121/69    Constitutional: No acute distress, awake, alert, frail appearing  HENT: NCAT, mucous membranes moist  Respiratory: Clear to auscultation bilaterally, respiratory effort normal   Cardiovascular: Tachycardic  Gastrointestinal: Positive bowel sounds, soft, nontender, nondistended  Musculoskeletal: No  "bilateral ankle edema  Psychiatric: Appropriate affect, cooperative  Neurologic: Oriented to person, no other focal neurodeficits that are new  Skin: No rashes   (consider inserting and editing \".LEXEXAMHP\" for qualifying comprehensive exam for Level 3 billing)    Brief Assessment/Plan :  See detailed assessment and plan developed with APC which I have reviewed and/or edited for completeness.      In summary 77-year-old male with a history of dementia who is presenting to the ED for the second time from his nursing facility because of altered mental status.  He was found to have leukocytosis on admission, with possible pneumonia seen on chest imaging.  He was placed on antibiotics and given IV fluids.  Plan for admission overnight for observation, reassessment in the morning for early discharge      Connie Sauer MD  03/07/23                    "

## 2023-03-08 NOTE — NURSING NOTE
Spoke with MD that was on the unit due to increased agitation, and constantly turning in the bed and moaning, and complaining of being anxious. No new verbal orders obtained.

## 2023-03-08 NOTE — CASE MANAGEMENT/SOCIAL WORK
Case Management Discharge Note      Final Note: Patient was discharge before CM could confirm that the patient return to [facility].  has left 2 messages with [facility]: 1. Message left for ASIM Jasso. 2. Message left for Olga Lidia nurse that cares for the patient. Per the patient's daughter, Mariah Cox, the DON stated that the patient could return without further assessment. CM unable to verify as the DON did not return my call. CM left a message for the nurse Olga Lidia to confirm the number for the  nurse to call report and confirm the fax to send the discharge summary. The patient's daughter took the patient without the RN calling report. CM to fax the discharge summary to the facility number that we have on file.         Selected Continued Care - Discharged on 3/8/2023 Admission date: 3/7/2023 - Discharge disposition: Home or Self Care    Destination    No services have been selected for the patient.              Durable Medical Equipment    No services have been selected for the patient.              Dialysis/Infusion    No services have been selected for the patient.              Home Medical Care    No services have been selected for the patient.              Therapy    No services have been selected for the patient.              Community Resources    No services have been selected for the patient.              Community & DME    No services have been selected for the patient.                       Final Discharge Disposition Code: 01 - home or self-care

## 2023-03-08 NOTE — PROGRESS NOTES
Pharmacy Consult-Vancomycin Dosing  Stephen Zarate is a  77 y.o. male receiving vancomycin therapy.     Indication: PNA/fever  Consulting Provider: hospitalist  ID Consult:     Goal AUC: 400 - 600 mg/L*hr    Current Antimicrobial Therapy  Anti-Infectives (From admission, onward)      Ordered     Dose/Rate Route Frequency Start Stop    03/07/23 2123  vancomycin (VANCOCIN) 1000 mg/200 mL dextrose 5% IVPB        Ordering Provider: Stephen Alvarado, Pavel    15 mg/kg × 69.4 kg  over 60 Minutes Intravenous Every 18 Hours 03/08/23 1200 03/13/23 1759    03/07/23 2111  piperacillin-tazobactam (ZOSYN) 3.375 g in iso-osmotic dextrose 50 ml (premix)        Ordering Provider: Pretty Rivers APRN    3.375 g  over 4 Hours Intravenous Every 8 Hours 03/08/23 0000 03/11/23 2359 03/07/23 2111  Pharmacy to dose vancomycin        Ordering Provider: Pretty Rivers APRN   See [facility] for full Linked Orders Report.     Does not apply Continuous PRN 03/07/23 2111 03/11/23 2110 03/07/23 1815  piperacillin-tazobactam (ZOSYN) 3.375 g in iso-osmotic dextrose 50 ml (premix)        Ordering Provider: Rudy Avilez DO    3.375 g  over 30 Minutes Intravenous Once 03/07/23 1817 03/07/23 1927 03/07/23 1815  vancomycin 1500 mg/500 mL 0.9% NS IVPB ([facility])        Ordering Provider: Rudy Avilez DO    20 mg/kg × 69.4 kg  over 90 Minutes Intravenous Once 03/07/23 1817 03/07/23 2057            Allergies  Allergies as of 03/07/2023 - Reviewed 03/07/2023   Allergen Reaction Noted    Inositol niacinate Hives 05/04/2011    Niacin Hives 05/04/2011    Shellfish-derived products Unknown - Low Severity 03/07/2023    Sulfa antibiotics  06/29/2016       Labs    Results from last 7 days   Lab Units 03/07/23  1652   BUN mg/dL 18   CREATININE mg/dL 0.84       Results from last 7 days   Lab Units 03/07/23  1652   WBC 10*3/mm3 11.31*       Evaluation of Dosing     Last Dose Received in the ED/Outside Facility: 1500mg  Is Patient on Dialysis or Renal  "Replacement: no    Ht - 175.3 cm (69\")  Wt - 69.4 kg (153 lb)    Estimated Creatinine Clearance: 72.3 mL/min (by C-G formula based on SCr of 0.84 mg/dL).    Intake & Output (last 3 days)         03/05 0701  03/06 0700 03/06 0701  03/07 0700 03/07 0701  03/08 0700    IV Piggyback   50    Total Intake(mL/kg)   50 (0.7)    Net   +50                   Microbiology and Radiology  Microbiology Results (last 10 days)       ** No results found for the last 240 hours. **            Reported Vancomycin Levels                         InsightRX AUC Calculation:    Current AUC:   0 mg/L*hr    Predicted Steady State AUC :   441 mg/L*hr    Assessment/Plan: The patient will be started on a bolus of 20mg/kg for a dose of 1500mg . Will initiate maintenance dose at 1000 mg IV every 18 hours.  Plan for trough as patient approaches steady state, prior to the 3rd dose.  Due to infection severity, will target an AUC of 400-600.      Pharmacy will continue to follow the patient’s culture results and clinical progress daily.    Stephen Alvarado, PharmD    "

## 2023-03-09 NOTE — OUTREACH NOTE
Prep Survey    Flowsheet Row Responses   [facility] facility patient discharged from? [place]   Is LACE score < 7 ? No   Eligibility Readm Mgmt   Discharge diagnosis Tachycardia    Does the patient have one of the following disease processes/diagnoses(primary or secondary)? Other   Does the patient have Home health ordered? No   Is there a DME ordered? No   General alerts for this patient  [facility]   Prep survey completed? Yes          SAI [id number] - Registered Nurse

## 2023-03-11 LAB
QT INTERVAL: 326 MS
QTC INTERVAL: 462 MS

## 2023-03-12 LAB
BACTERIA SPEC AEROBE CULT: NORMAL
BACTERIA SPEC AEROBE CULT: NORMAL

## 2023-03-23 NOTE — ED PROVIDER NOTES
EMERGENCY DEPARTMENT ENCOUNTER    Pt Name: Stephen Zarate  MRN: [id number]  Pt :   1945  Room Number:    Date of encounter:  3/7/2023  PCP: Hunter Gutierres APRN  ED Provider: LUZ ELENA Alvarez    Historian: Patient and family    HPI:  Chief Complaint: Fall    Context: Stephen Zarate is a 77 y.o. male who presents to the ED accompanied by family for evaluation following a fall.  Per EMS reports and family present at bedside patient reportedly slid out of bed and fell onto his left side.  Patient is anticoagulated on Eliquis for history of atrial fibrillation.  Denies any head injuries.  Per family at bedside patient has history of dementia and is at his baseline mental status.  HPI     REVIEW OF SYSTEMS  A chief complaint appropriate review of systems was completed and is negative except as noted in the HPI.     PAST MEDICAL HISTORY  Past Medical History:   Diagnosis Date   • Atrial fibrillation (HCC)    • Dementia (HCC)    • Diabetes mellitus (HCC)    • Hyperlipidemia    • Hypertension    • Seizure (HCC)    • Stroke (HCC)        PAST SURGICAL HISTORY  No past surgical history on file.    FAMILY HISTORY  Family History   Problem Relation Age of Onset   • Dementia Mother    • Alzheimer's disease Mother    • Hypertension Mother    • Stroke Mother    • Dementia Father    • Alzheimer's disease Father    • Hypertension Father        SOCIAL HISTORY  Social History     Socioeconomic History   • Marital status:    Tobacco Use   • Smoking status: Never   • Smokeless tobacco: Never   Vaping Use   • Vaping Use: Never used   Substance and Sexual Activity   • Alcohol use: No   • Drug use: No   • Sexual activity: Defer       ALLERGIES  Inositol niacinate, Niacin, Shellfish-derived products, and Sulfa antibiotics    PHYSICAL EXAM  Physical Exam  GENERAL:   Appears in no acute distress.  HENT: Nares patent.  EYES: No scleral icterus.  CV: Regular rhythm, regular rate.  RESPIRATORY: Normal effort.  No audible  wheezes, rales or rhonchi.  ABDOMEN: Soft, nontender  MUSCULOSKELETAL: No deformities.   NEURO: No focal deficits appreciated.  SKIN: Warm, dry, no rash visualized.  I have reviewed the triage vital signs and nursing notes.    Physical Exam     LAB RESULTS  Results for orders placed or performed in visit on 06/29/16   CBC auto differential    Specimen: Blood   Result Value Ref Range    WBC 7.14 3.50 - 10.80 10*3/mm3    RBC 4.44 4.20 - 5.76 10*6/mm3    Hemoglobin 14.5 13.1 - 17.5 g/dL    Hematocrit 41.6 38.9 - 50.9 %    MCV 93.7 80.0 - 99.0 fL    MCH 32.7 (H) 27.0 - 31.0 pg    MCHC 34.9 32.0 - 36.0 g/dL    RDW 13.5 11.3 - 14.5 %    RDW-SD 46.2 37.0 - 54.0 fl    MPV 10.5 6.0 - 12.0 fL    Platelets 201 150 - 450 10*3/mm3    Neutrophil % 66.3 41.0 - 71.0 %    Lymphocyte % 18.8 (L) 24.0 - 44.0 %    Monocyte % 9.0 0.0 - 12.0 %    Eosinophil % 4.5 (H) 0.0 - 3.0 %    Basophil % 0.8 0.0 - 1.0 %    Immature Grans % 0.6 0.0 - 0.6 %    Neutrophils, Absolute 4.74 1.50 - 8.30 10*3/mm3    Lymphocytes, Absolute 1.34 0.60 - 4.80 10*3/mm3    Monocytes, Absolute 0.64 0.00 - 1.00 10*3/mm3    Eosinophils, Absolute 0.32 (H) 0.10 - 0.30 10*3/mm3    Basophils, Absolute 0.06 0.00 - 0.20 10*3/mm3    Immature Grans, Absolute 0.04 (H) 0.00 - 0.03 10*3/mm3       If labs were ordered, I independently reviewed the results and considered them in treating the patient.    RADIOLOGY  CT Head Without Contrast   Final Result      1. No acute intracranial abnormality.   2. Moderate volume loss and markedly severe chronic small vessel ischemic changes, similar to the MRI on August 2, 2017.               Electronically signed by:  Anthony Chavez M.D.     3/7/2023 2:52 AM [place]      CT Pelvis Without Contrast   Final Result   Normal CT examination of the pelvis. No fracture or dislocation.      Electronically signed by:  Zachariah Garcia M.D.     3/7/2023 2:46 AM [place]        [x] Radiologist's Report Reviewed:  I ordered and independently  reviewed the above noted radiographic studies.  See radiologist's dictation for official interpretation.      PROCEDURES    Procedures    No orders to display       MEDICATIONS GIVEN IN ER    Medications - No data to display    MEDICAL DECISION MAKING, PROGRESS, and CONSULTS   Medical Decision Making  Anticoagulated by anticoagulation treatment: chronic illness or injury  Fall, initial encounter: acute illness or injury  History of atrial fibrillation: chronic illness or injury  History of dementia: chronic illness or injury  History of diabetes mellitus: chronic illness or injury  History of hyperlipidemia: chronic illness or injury  History of hypertension: chronic illness or injury  History of seizure: chronic illness or injury  History of stroke: chronic illness or injury  Amount and/or Complexity of Data Reviewed  Radiology: ordered.          All labs have been independently reviewed by me.  All radiology studies have been reviewed by me and the radiologist dictating the report.  All EKG's have been independently viewed by me.    [] Discussed with radiology regarding test interpretation:    Discussion below represents my analysis of pertinent findings related to patient's condition, differential diagnosis, treatment plan and final disposition.    Differential diagnosis:  The differential diagnosis associated with the patient's presentation includes: Pres-syncopal/syncopal episode, arrhythmia, ACS, CVA, TIA, sensory impairment (vision, vestibular, neuropathy, proprioception), vertigo,  weakness, electrolyte abnormality, UTI, pneumonia, decreased balance, gait abnormality, arthritis, polypharmacy or drug effect, environment (rugs, stairs, uneven surface, lighting).     Additional sources  Discussed/ obtained information from independent historians:   [] Spouse  [] Parent  [x] Family member  [] Friend  [x] EMS   [] Other:  External (non-ED) record review:   [x] Inpatient record: Recent inpatient hospitalization  reviewed   [] Office record:   [x] Outpatient record: Neurology records reviewed demonstrating history of vascular dementia   [] Prior Outpatient labs:   [] Prior Outpatient radiology:   [] Primary Care record:   [] Outside ED record:   [] Other:   Patient's care impacted by:   [x] Diabetes  [x] Hypertension  [x] Hyperlipidemia  [] Hypothyroidism   [] Coronary Artery Disease   [] COPD   [] Cancer   [] Obesity   [] Tobacco Abuse   [] Substance Abuse    [] Anxiety   [] Depression   [x] Other: History of CVA, history of seizure, history of atrial fibrillation on anticoagulation    Care significantly affected by Social Determinants of Health (housing and economic circumstances, unemployment)    [] Yes     [x] No   If yes, Patient's care significantly limited by  Social Determinants of Health including:   [] Inadequate housing   [] Low income   [] Alcoholism and drug addiction in family   [] Problems related to primary support group   [] Unemployment   [] Problems related to employment   [] Other Social Determinants of Health:     Shared decision making: I reviewed findings of ED work-up with patient and family member at bedside.  Again family stress there was no need for additional work-up in the ED based on negative imaging and they were in agreement with patient being discharged back to the nursing home.    Orders placed during this visit:  Orders Placed This Encounter   Procedures   • CT Head Without Contrast   • CT Pelvis Without Contrast       ED Course:    ED Course as of 03/23/23 1618   Tue Mar 07, 2023   3355 I had a conversation with family member who is present at bedside.  They stated that they believe the fall was a mechanical fall.  They wish to proceed with imaging but declined any additional work-up in the emergency department.  Family states that patient has had recent lab work and urinalysis that demonstrated no findings acutely.  We will proceed with CT head and CT pelvis. [JG]   [id number] In summary this  is a 77-year-old male with history of dementia and multiple medical comorbidities who presents to the ER for evaluation following a fall.  No acute or emergent findings demonstrated on physical exam.  Family at bedside reports patient to be at normal baseline mental status.  CT head and CT pelvis obtained in the ED demonstrates no acute or emergent abnormalities.  Family declined any additional work-up in the emergency department and was in agreement with patient being sent back to the facility and follow-up as needed with primary care. [JG]      ED Course User Index  [JG] Hunter Velazquez PA            DIAGNOSIS  Final diagnoses:   Fall, initial encounter   Anticoagulated by anticoagulation treatment   History of atrial fibrillation   History of dementia   History of diabetes mellitus   History of hypertension   History of hyperlipidemia   History of seizure   History of stroke       DISPOSITION    ED Disposition     ED Disposition   Discharge    Condition   Stable    Comment   --             Please note that portions of this document were completed with voice recognition software.      Hunter Velazquez PA  03/23/23 [id number]

## 2023-03-28 ENCOUNTER — READMISSION MANAGEMENT (OUTPATIENT)
Dept: CALL CENTER | Facility: HOSPITAL | Age: 78
End: 2023-03-28
Payer: MEDICARE

## 2023-03-28 NOTE — OUTREACH NOTE
Medical Week 3 Survey    Flowsheet Row Responses   [facility] patient discharged from? [facility]   Does the patient have one of the following disease processes/diagnoses(primary or secondary)? Other   Week 3 attempt successful? No   Unsuccessful attempts Attempt 1          Sunita Vergara Registered Nurse